# Patient Record
Sex: FEMALE | Race: ASIAN | NOT HISPANIC OR LATINO | URBAN - METROPOLITAN AREA
[De-identification: names, ages, dates, MRNs, and addresses within clinical notes are randomized per-mention and may not be internally consistent; named-entity substitution may affect disease eponyms.]

---

## 2019-05-10 ENCOUNTER — INPATIENT (INPATIENT)
Facility: HOSPITAL | Age: 84
LOS: 10 days | Discharge: NURSING, REHAB OR LONG-TERM CARE | DRG: 312 | End: 2019-05-21
Attending: INTERNAL MEDICINE | Admitting: INTERNAL MEDICINE
Payer: MEDICARE

## 2019-05-10 VITALS
WEIGHT: 98.11 LBS | OXYGEN SATURATION: 97 % | HEART RATE: 93 BPM | RESPIRATION RATE: 16 BRPM | TEMPERATURE: 97 F | DIASTOLIC BLOOD PRESSURE: 53 MMHG | HEIGHT: 59 IN | SYSTOLIC BLOOD PRESSURE: 127 MMHG

## 2019-05-10 DIAGNOSIS — Z29.9 ENCOUNTER FOR PROPHYLACTIC MEASURES, UNSPECIFIED: ICD-10-CM

## 2019-05-10 DIAGNOSIS — N18.6 END STAGE RENAL DISEASE: ICD-10-CM

## 2019-05-10 DIAGNOSIS — R55 SYNCOPE AND COLLAPSE: ICD-10-CM

## 2019-05-10 DIAGNOSIS — I77.0 ARTERIOVENOUS FISTULA, ACQUIRED: Chronic | ICD-10-CM

## 2019-05-10 DIAGNOSIS — E87.6 HYPOKALEMIA: ICD-10-CM

## 2019-05-10 DIAGNOSIS — C90.00 MULTIPLE MYELOMA NOT HAVING ACHIEVED REMISSION: ICD-10-CM

## 2019-05-10 DIAGNOSIS — I82.409 ACUTE EMBOLISM AND THROMBOSIS OF UNSPECIFIED DEEP VEINS OF UNSPECIFIED LOWER EXTREMITY: ICD-10-CM

## 2019-05-10 DIAGNOSIS — E78.5 HYPERLIPIDEMIA, UNSPECIFIED: ICD-10-CM

## 2019-05-10 DIAGNOSIS — A04.72 ENTEROCOLITIS DUE TO CLOSTRIDIUM DIFFICILE, NOT SPECIFIED AS RECURRENT: ICD-10-CM

## 2019-05-10 LAB
ALBUMIN SERPL ELPH-MCNC: 1.9 G/DL — LOW (ref 3.5–5)
ALP SERPL-CCNC: 110 U/L — SIGNIFICANT CHANGE UP (ref 40–120)
ALT FLD-CCNC: 13 U/L DA — SIGNIFICANT CHANGE UP (ref 10–60)
ANION GAP SERPL CALC-SCNC: 8 MMOL/L — SIGNIFICANT CHANGE UP (ref 5–17)
ANION GAP SERPL CALC-SCNC: 8 MMOL/L — SIGNIFICANT CHANGE UP (ref 5–17)
APTT BLD: 27.9 SEC — SIGNIFICANT CHANGE UP (ref 27.5–36.3)
AST SERPL-CCNC: 25 U/L — SIGNIFICANT CHANGE UP (ref 10–40)
BASOPHILS # BLD AUTO: 0.01 K/UL — SIGNIFICANT CHANGE UP (ref 0–0.2)
BASOPHILS NFR BLD AUTO: 0.1 % — SIGNIFICANT CHANGE UP (ref 0–2)
BILIRUB SERPL-MCNC: 0.4 MG/DL — SIGNIFICANT CHANGE UP (ref 0.2–1.2)
BUN SERPL-MCNC: 23 MG/DL — HIGH (ref 7–18)
BUN SERPL-MCNC: 25 MG/DL — HIGH (ref 7–18)
CALCIUM SERPL-MCNC: 7.3 MG/DL — LOW (ref 8.4–10.5)
CALCIUM SERPL-MCNC: 7.5 MG/DL — LOW (ref 8.4–10.5)
CHLORIDE SERPL-SCNC: 100 MMOL/L — SIGNIFICANT CHANGE UP (ref 96–108)
CHLORIDE SERPL-SCNC: 100 MMOL/L — SIGNIFICANT CHANGE UP (ref 96–108)
CO2 SERPL-SCNC: 31 MMOL/L — SIGNIFICANT CHANGE UP (ref 22–31)
CO2 SERPL-SCNC: 31 MMOL/L — SIGNIFICANT CHANGE UP (ref 22–31)
CREAT SERPL-MCNC: 3.86 MG/DL — HIGH (ref 0.5–1.3)
CREAT SERPL-MCNC: 4.06 MG/DL — HIGH (ref 0.5–1.3)
EOSINOPHIL # BLD AUTO: 0 K/UL — SIGNIFICANT CHANGE UP (ref 0–0.5)
EOSINOPHIL NFR BLD AUTO: 0 % — SIGNIFICANT CHANGE UP (ref 0–6)
GLUCOSE SERPL-MCNC: 104 MG/DL — HIGH (ref 70–99)
GLUCOSE SERPL-MCNC: 87 MG/DL — SIGNIFICANT CHANGE UP (ref 70–99)
HCT VFR BLD CALC: 26 % — LOW (ref 34.5–45)
HGB BLD-MCNC: 8.5 G/DL — LOW (ref 11.5–15.5)
IMM GRANULOCYTES NFR BLD AUTO: 0.4 % — SIGNIFICANT CHANGE UP (ref 0–1.5)
INR BLD: 0.97 RATIO — SIGNIFICANT CHANGE UP (ref 0.88–1.16)
LIDOCAIN IGE QN: 179 U/L — SIGNIFICANT CHANGE UP (ref 73–393)
LYMPHOCYTES # BLD AUTO: 1.29 K/UL — SIGNIFICANT CHANGE UP (ref 1–3.3)
LYMPHOCYTES # BLD AUTO: 12.3 % — LOW (ref 13–44)
MCHC RBC-ENTMCNC: 32.2 PG — SIGNIFICANT CHANGE UP (ref 27–34)
MCHC RBC-ENTMCNC: 32.7 GM/DL — SIGNIFICANT CHANGE UP (ref 32–36)
MCV RBC AUTO: 98.5 FL — SIGNIFICANT CHANGE UP (ref 80–100)
MONOCYTES # BLD AUTO: 1.34 K/UL — HIGH (ref 0–0.9)
MONOCYTES NFR BLD AUTO: 12.8 % — SIGNIFICANT CHANGE UP (ref 2–14)
NEUTROPHILS # BLD AUTO: 7.78 K/UL — HIGH (ref 1.8–7.4)
NEUTROPHILS NFR BLD AUTO: 74.4 % — SIGNIFICANT CHANGE UP (ref 43–77)
NRBC # BLD: 0 /100 WBCS — SIGNIFICANT CHANGE UP (ref 0–0)
PLATELET # BLD AUTO: 128 K/UL — LOW (ref 150–400)
POTASSIUM SERPL-MCNC: 2.7 MMOL/L — CRITICAL LOW (ref 3.5–5.3)
POTASSIUM SERPL-MCNC: 3 MMOL/L — LOW (ref 3.5–5.3)
POTASSIUM SERPL-SCNC: 2.7 MMOL/L — CRITICAL LOW (ref 3.5–5.3)
POTASSIUM SERPL-SCNC: 3 MMOL/L — LOW (ref 3.5–5.3)
PROT SERPL-MCNC: 4.3 G/DL — LOW (ref 6–8.3)
PROTHROM AB SERPL-ACNC: 10.7 SEC — SIGNIFICANT CHANGE UP (ref 10–12.9)
RBC # BLD: 2.64 M/UL — LOW (ref 3.8–5.2)
RBC # FLD: 21.6 % — HIGH (ref 10.3–14.5)
SODIUM SERPL-SCNC: 139 MMOL/L — SIGNIFICANT CHANGE UP (ref 135–145)
SODIUM SERPL-SCNC: 139 MMOL/L — SIGNIFICANT CHANGE UP (ref 135–145)
TROPONIN I SERPL-MCNC: 0.04 NG/ML — SIGNIFICANT CHANGE UP (ref 0–0.04)
WBC # BLD: 10.46 K/UL — SIGNIFICANT CHANGE UP (ref 3.8–10.5)
WBC # FLD AUTO: 10.46 K/UL — SIGNIFICANT CHANGE UP (ref 3.8–10.5)

## 2019-05-10 PROCEDURE — 99285 EMERGENCY DEPT VISIT HI MDM: CPT

## 2019-05-10 PROCEDURE — 71045 X-RAY EXAM CHEST 1 VIEW: CPT | Mod: 26

## 2019-05-10 RX ORDER — SEVELAMER CARBONATE 2400 MG/1
800 POWDER, FOR SUSPENSION ORAL
Refills: 0 | Status: DISCONTINUED | OUTPATIENT
Start: 2019-05-10 | End: 2019-05-21

## 2019-05-10 RX ORDER — ANASTROZOLE 1 MG/1
1 TABLET ORAL DAILY
Refills: 0 | Status: DISCONTINUED | OUTPATIENT
Start: 2019-05-10 | End: 2019-05-21

## 2019-05-10 RX ORDER — BRIMONIDINE TARTRATE 2 MG/MG
1 SOLUTION/ DROPS OPHTHALMIC
Qty: 0 | Refills: 0 | DISCHARGE

## 2019-05-10 RX ORDER — ACYCLOVIR SODIUM 500 MG
200 VIAL (EA) INTRAVENOUS DAILY
Refills: 0 | Status: DISCONTINUED | OUTPATIENT
Start: 2019-05-10 | End: 2019-05-21

## 2019-05-10 RX ORDER — SODIUM CHLORIDE 9 MG/ML
1000 INJECTION INTRAMUSCULAR; INTRAVENOUS; SUBCUTANEOUS
Refills: 0 | Status: DISCONTINUED | OUTPATIENT
Start: 2019-05-10 | End: 2019-05-14

## 2019-05-10 RX ORDER — POTASSIUM CHLORIDE 20 MEQ
40 PACKET (EA) ORAL ONCE
Refills: 0 | Status: COMPLETED | OUTPATIENT
Start: 2019-05-10 | End: 2019-05-10

## 2019-05-10 RX ORDER — ENTECAVIR 0.5 MG/1
1 TABLET ORAL
Qty: 0 | Refills: 0 | DISCHARGE

## 2019-05-10 RX ORDER — POTASSIUM CHLORIDE 20 MEQ
10 PACKET (EA) ORAL ONCE
Refills: 0 | Status: COMPLETED | OUTPATIENT
Start: 2019-05-10 | End: 2019-05-10

## 2019-05-10 RX ORDER — ANASTROZOLE 1 MG/1
1 TABLET ORAL
Qty: 0 | Refills: 0 | DISCHARGE

## 2019-05-10 RX ORDER — APIXABAN 2.5 MG/1
2.5 TABLET, FILM COATED ORAL EVERY 12 HOURS
Refills: 0 | Status: DISCONTINUED | OUTPATIENT
Start: 2019-05-10 | End: 2019-05-21

## 2019-05-10 RX ORDER — ALLOPURINOL 300 MG
100 TABLET ORAL DAILY
Refills: 0 | Status: DISCONTINUED | OUTPATIENT
Start: 2019-05-10 | End: 2019-05-21

## 2019-05-10 RX ORDER — ALLOPURINOL 300 MG
1 TABLET ORAL
Qty: 0 | Refills: 0 | DISCHARGE

## 2019-05-10 RX ORDER — BRIMONIDINE TARTRATE 2 MG/MG
1 SOLUTION/ DROPS OPHTHALMIC EVERY 8 HOURS
Refills: 0 | Status: DISCONTINUED | OUTPATIENT
Start: 2019-05-10 | End: 2019-05-21

## 2019-05-10 RX ORDER — VANCOMYCIN HCL 1 G
125 VIAL (EA) INTRAVENOUS EVERY 6 HOURS
Refills: 0 | Status: COMPLETED | OUTPATIENT
Start: 2019-05-10 | End: 2019-05-20

## 2019-05-10 RX ORDER — SIMVASTATIN 20 MG/1
1 TABLET, FILM COATED ORAL
Qty: 0 | Refills: 0 | DISCHARGE

## 2019-05-10 RX ORDER — POTASSIUM CHLORIDE 20 MEQ
20 PACKET (EA) ORAL ONCE
Refills: 0 | Status: COMPLETED | OUTPATIENT
Start: 2019-05-10 | End: 2019-05-10

## 2019-05-10 RX ORDER — SEVELAMER CARBONATE 2400 MG/1
1 POWDER, FOR SUSPENSION ORAL
Qty: 0 | Refills: 0 | DISCHARGE

## 2019-05-10 RX ORDER — SIMVASTATIN 20 MG/1
40 TABLET, FILM COATED ORAL AT BEDTIME
Refills: 0 | Status: DISCONTINUED | OUTPATIENT
Start: 2019-05-10 | End: 2019-05-21

## 2019-05-10 RX ORDER — ACYCLOVIR SODIUM 500 MG
1 VIAL (EA) INTRAVENOUS
Qty: 0 | Refills: 0 | DISCHARGE

## 2019-05-10 RX ORDER — FUROSEMIDE 40 MG
80 TABLET ORAL DAILY
Refills: 0 | Status: DISCONTINUED | OUTPATIENT
Start: 2019-05-10 | End: 2019-05-10

## 2019-05-10 RX ADMIN — Medication 100 MILLIEQUIVALENT(S): at 12:55

## 2019-05-10 RX ADMIN — Medication 125 MILLIGRAM(S): at 23:01

## 2019-05-10 RX ADMIN — SIMVASTATIN 40 MILLIGRAM(S): 20 TABLET, FILM COATED ORAL at 22:57

## 2019-05-10 RX ADMIN — SODIUM CHLORIDE 60 MILLILITER(S): 9 INJECTION INTRAMUSCULAR; INTRAVENOUS; SUBCUTANEOUS at 22:58

## 2019-05-10 RX ADMIN — Medication 40 MILLIEQUIVALENT(S): at 12:55

## 2019-05-10 RX ADMIN — APIXABAN 2.5 MILLIGRAM(S): 2.5 TABLET, FILM COATED ORAL at 18:34

## 2019-05-10 RX ADMIN — BRIMONIDINE TARTRATE 1 DROP(S): 2 SOLUTION/ DROPS OPHTHALMIC at 22:57

## 2019-05-10 RX ADMIN — Medication 20 MILLIEQUIVALENT(S): at 23:35

## 2019-05-10 RX ADMIN — Medication 125 MILLIGRAM(S): at 19:11

## 2019-05-10 NOTE — H&P ADULT - HISTORY OF PRESENT ILLNESS
85 y/o Female from home with PMHX ESRD on HD. MM on chemo, Cdiff , DVT comes to ED for syncopal episode. Daughter at bedside  who states that pt had two witnessed syncopal events this morning. She says that her legs gave in and she grabbed her. No seizure like activity. Pt was sitting when occurred and dropped water bottle but did not fall, hit head or any trama from episodes. Daughter stated this happened once last year. otherwise pt has been fine. Denies nausea, vomiting, diarrhea, abdominal pain, SOB, chest pain, VU, palpitations, cough, wheezing, joint pain or swelling, fever, chills.

## 2019-05-10 NOTE — ED PROVIDER NOTE - MUSCULOSKELETAL, MLM
Spine appears normal, range of motion is not limited, no muscle or joint tenderness. right dialysis subclavian port present. bilaterally LE edema 2+ pitting. Port otherwise normal

## 2019-05-10 NOTE — H&P ADULT - PROBLEM SELECTOR PLAN 8
IMPROVE VTE score: 9  C/W Eliquis     [x ] Previous VTE                                    3  [ ] Thrombophilia                                  2  [ x] Lower limb paralysis                        2  (unable to hold up >15 seconds)    [ x] Current Cancer (within 6 months)        2   [x] Immobilization > 24 hrs                    1  [ ] ICU/CCU stay > 24 hrs                      1  [x] Age > 60                                         1

## 2019-05-10 NOTE — H&P ADULT - ASSESSMENT
HPI: 85 y/o Female from home with PMHX ESRD on HD. MM on chemo, Cdiff , DVT comes to ED for syncopal episode. Daughter at bedside  who states that pt had two witnessed syncopal events this morning. She says that her legs gave in and she grabbed her. No seizure like activity. Pt was sitting when occurred and dropped water bottle but did not fall, hit head or any trama from episodes. Daughter stated this happened once last year. otherwise pt has been fine. Denies nausea, vomiting, diarrhea, abdominal pain, SOB, chest pain, VU, palpitations, cough, wheezing, joint pain or swelling, fever, chills.    ED course: Patient examined at bedside  VS WNL except for   Physical exam as above with pertinent findings of   Labs significant for  Imaging significant for    Patient will be admitted to the floor/telemetry due to HPI: 83 y/o Female from home with PMHX ESRD on HD. MM on chemo, Cdiff , DVT comes to ED for syncopal episode. Daughter at bedside  who states that pt had two witnessed syncopal events this morning. She says that her legs gave in and she grabbed her. No seizure like activity. Pt was sitting when occurred and dropped water bottle but did not fall, hit head or any trama from episodes. Daughter stated this happened once last year. otherwise pt has been fine. Denies nausea, vomiting, diarrhea, abdominal pain, SOB, chest pain, VU, palpitations, cough, wheezing, joint pain or swelling, fever, chills.    ED course: Patient examined at bedside in NAD AAOx3  VS T(C): 36.6 HR: 79 BP: 112/45 RR: 16 SpO2: 97% (  Physical exam as above   Labs significant for                        8.5    10.46 )-----------( 128      ( 10 May 2019 11:36 )             26.0   05-10    139  |  100  |  23<H>  ----------------------------<  87  2.7<LL>   |  31  |  3.86<H>    Ca    7.5<L>      10 May 2019 11:36    TPro  4.3<L>  /  Alb  1.9<L>  /  TBili  0.4  /  DBili  x   /  AST  25  /  ALT  13  /  AlkPhos  110  05-10    Imaging significant for:   Large-caliber double-lumen right jugular line is noted with tip in the   right atrial region.  Left-sided Niecfs-p-Omrn noted with tip at the caval atrial junction.    Patient will be admitted to the telemetry due to Syncopal episode

## 2019-05-10 NOTE — H&P ADULT - PROBLEM SELECTOR PLAN 5
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

## 2019-05-10 NOTE — H&P ADULT - PROBLEM SELECTOR PLAN 1
Syncopal episode  No trauma.  No chest Pain, no palpitations, no SOB  Differential include: Cardiac Syncope vs Neurocardiogenic syncope  vs Medications in the setting of Chemo vs dehydration in the setting of diarrhea ( Patient with history of Cdiff vs Metabolic  Work up done:  - Electrolytes: Low potassium of 2.7 Replaced Follow BMP   - Check Orthostatics VS  - Check Echo cardiogram  - EKG no Ischemic Changes  - Troponin Negative  - Cardiology: Dr Álvarez

## 2019-05-10 NOTE — H&P ADULT - PROBLEM SELECTOR PLAN 7
IMPROVE VTE score: 9  C/W Eliquis     [x ] Previous VTE                                    3  [ ] Thrombophilia                                  2  [ x] Lower limb paralysis                        2  (unable to hold up >15 seconds)    [ x] Current Cancer (within 6 months)        2   [x] Immobilization > 24 hrs                    1  [ ] ICU/CCU stay > 24 hrs                      1  [x] Age > 60                                         1 History of MM on chemo   Primary care team to contact Heme Oncologist Dr Nate Singh

## 2019-05-10 NOTE — ED ADULT NURSE NOTE - OBJECTIVE STATEMENT
As per daughter pt with syncopal episode x 2. BLLE edema +2 noted. Right CW permacath and Left CW Mediport

## 2019-05-10 NOTE — H&P ADULT - NSICDXPASTMEDICALHX_GEN_ALL_CORE_FT
PAST MEDICAL HISTORY:  Deep vein thrombosis (DVT)     End stage renal disease     HLD (hyperlipidemia)     Multiple myeloma

## 2019-05-10 NOTE — ED PROVIDER NOTE - OBJECTIVE STATEMENT
85 y/o F with PMHX end restage renal disease comes to ED for snycope. pt is accompanied by daughter who states that pt had two witnessed syncopal events this morning witnessed by daughter. no seizure like activity. Pt was sitting when occurred and dropped water bottle but did not fall, hit head or any trama from episodes. Daughter stated this happened once last year. otherwise pt has been fine. Denies Cp, SOB, N/V, diaphoresis.  Pt last had dialysis on Wednesday

## 2019-05-10 NOTE — H&P ADULT - PROBLEM SELECTOR PLAN 2
Potassium of 2.7  Received Oral Potassium  Check BMP Potassium of 2.7  Will hold patient Lasix 80mg daily for now  Received Oral Potassium  Check repeat BMP

## 2019-05-10 NOTE — H&P ADULT - PROBLEM SELECTOR PLAN 4
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

## 2019-05-10 NOTE — H&P ADULT - PROBLEM SELECTOR PLAN 3
History of DVT of lower extremities  C/W Eliquis Patient with history of C diff on Vancomycin 125mg q6hrs to complete for 10 days

## 2019-05-11 LAB
ANION GAP SERPL CALC-SCNC: 6 MMOL/L — SIGNIFICANT CHANGE UP (ref 5–17)
BASOPHILS # BLD AUTO: 0.01 K/UL — SIGNIFICANT CHANGE UP (ref 0–0.2)
BASOPHILS NFR BLD AUTO: 0.1 % — SIGNIFICANT CHANGE UP (ref 0–2)
BUN SERPL-MCNC: 27 MG/DL — HIGH (ref 7–18)
CALCIUM SERPL-MCNC: 7.1 MG/DL — LOW (ref 8.4–10.5)
CHLORIDE SERPL-SCNC: 103 MMOL/L — SIGNIFICANT CHANGE UP (ref 96–108)
CHOLEST SERPL-MCNC: 111 MG/DL — SIGNIFICANT CHANGE UP (ref 10–199)
CO2 SERPL-SCNC: 31 MMOL/L — SIGNIFICANT CHANGE UP (ref 22–31)
CREAT SERPL-MCNC: 4.32 MG/DL — HIGH (ref 0.5–1.3)
EOSINOPHIL # BLD AUTO: 0 K/UL — SIGNIFICANT CHANGE UP (ref 0–0.5)
EOSINOPHIL NFR BLD AUTO: 0 % — SIGNIFICANT CHANGE UP (ref 0–6)
FOLATE SERPL-MCNC: 5.1 NG/ML — SIGNIFICANT CHANGE UP
GLUCOSE SERPL-MCNC: 85 MG/DL — SIGNIFICANT CHANGE UP (ref 70–99)
HBA1C BLD-MCNC: 5.7 % — HIGH (ref 4–5.6)
HCT VFR BLD CALC: 25.4 % — LOW (ref 34.5–45)
HDLC SERPL-MCNC: 50 MG/DL — SIGNIFICANT CHANGE UP
HGB BLD-MCNC: 8.1 G/DL — LOW (ref 11.5–15.5)
IMM GRANULOCYTES NFR BLD AUTO: 0.5 % — SIGNIFICANT CHANGE UP (ref 0–1.5)
LIPID PNL WITH DIRECT LDL SERPL: 39 MG/DL — SIGNIFICANT CHANGE UP
LYMPHOCYTES # BLD AUTO: 1.47 K/UL — SIGNIFICANT CHANGE UP (ref 1–3.3)
LYMPHOCYTES # BLD AUTO: 14.5 % — SIGNIFICANT CHANGE UP (ref 13–44)
MAGNESIUM SERPL-MCNC: 2.1 MG/DL — SIGNIFICANT CHANGE UP (ref 1.6–2.6)
MCHC RBC-ENTMCNC: 31.2 PG — SIGNIFICANT CHANGE UP (ref 27–34)
MCHC RBC-ENTMCNC: 31.9 GM/DL — LOW (ref 32–36)
MCV RBC AUTO: 97.7 FL — SIGNIFICANT CHANGE UP (ref 80–100)
MONOCYTES # BLD AUTO: 1.36 K/UL — HIGH (ref 0–0.9)
MONOCYTES NFR BLD AUTO: 13.4 % — SIGNIFICANT CHANGE UP (ref 2–14)
NEUTROPHILS # BLD AUTO: 7.26 K/UL — SIGNIFICANT CHANGE UP (ref 1.8–7.4)
NEUTROPHILS NFR BLD AUTO: 71.5 % — SIGNIFICANT CHANGE UP (ref 43–77)
NRBC # BLD: 0 /100 WBCS — SIGNIFICANT CHANGE UP (ref 0–0)
PHOSPHATE SERPL-MCNC: 4.4 MG/DL — SIGNIFICANT CHANGE UP (ref 2.5–4.5)
PLATELET # BLD AUTO: 109 K/UL — LOW (ref 150–400)
POTASSIUM SERPL-MCNC: 3.6 MMOL/L — SIGNIFICANT CHANGE UP (ref 3.5–5.3)
POTASSIUM SERPL-SCNC: 3.6 MMOL/L — SIGNIFICANT CHANGE UP (ref 3.5–5.3)
RBC # BLD: 2.6 M/UL — LOW (ref 3.8–5.2)
RBC # FLD: 21.6 % — HIGH (ref 10.3–14.5)
SODIUM SERPL-SCNC: 140 MMOL/L — SIGNIFICANT CHANGE UP (ref 135–145)
TOTAL CHOLESTEROL/HDL RATIO MEASUREMENT: 2.2 RATIO — LOW (ref 3.3–7.1)
TRIGL SERPL-MCNC: 112 MG/DL — SIGNIFICANT CHANGE UP (ref 10–149)
TSH SERPL-MCNC: 3.97 UU/ML — SIGNIFICANT CHANGE UP (ref 0.34–4.82)
VIT B12 SERPL-MCNC: 983 PG/ML — SIGNIFICANT CHANGE UP (ref 232–1245)
WBC # BLD: 10.15 K/UL — SIGNIFICANT CHANGE UP (ref 3.8–10.5)
WBC # FLD AUTO: 10.15 K/UL — SIGNIFICANT CHANGE UP (ref 3.8–10.5)

## 2019-05-11 RX ORDER — POTASSIUM CHLORIDE 20 MEQ
20 PACKET (EA) ORAL ONCE
Refills: 0 | Status: COMPLETED | OUTPATIENT
Start: 2019-05-11 | End: 2019-05-11

## 2019-05-11 RX ORDER — POTASSIUM CHLORIDE 20 MEQ
20 PACKET (EA) ORAL ONCE
Refills: 0 | Status: DISCONTINUED | OUTPATIENT
Start: 2019-05-11 | End: 2019-05-11

## 2019-05-11 RX ADMIN — APIXABAN 2.5 MILLIGRAM(S): 2.5 TABLET, FILM COATED ORAL at 06:03

## 2019-05-11 RX ADMIN — Medication 125 MILLIGRAM(S): at 13:39

## 2019-05-11 RX ADMIN — SODIUM CHLORIDE 60 MILLILITER(S): 9 INJECTION INTRAMUSCULAR; INTRAVENOUS; SUBCUTANEOUS at 19:55

## 2019-05-11 RX ADMIN — BRIMONIDINE TARTRATE 1 DROP(S): 2 SOLUTION/ DROPS OPHTHALMIC at 06:03

## 2019-05-11 RX ADMIN — Medication 20 MILLIEQUIVALENT(S): at 01:10

## 2019-05-11 RX ADMIN — Medication 125 MILLIGRAM(S): at 23:13

## 2019-05-11 RX ADMIN — SEVELAMER CARBONATE 800 MILLIGRAM(S): 2400 POWDER, FOR SUSPENSION ORAL at 19:55

## 2019-05-11 RX ADMIN — SEVELAMER CARBONATE 800 MILLIGRAM(S): 2400 POWDER, FOR SUSPENSION ORAL at 09:17

## 2019-05-11 RX ADMIN — Medication 100 MILLIGRAM(S): at 13:37

## 2019-05-11 RX ADMIN — BRIMONIDINE TARTRATE 1 DROP(S): 2 SOLUTION/ DROPS OPHTHALMIC at 13:38

## 2019-05-11 RX ADMIN — Medication 125 MILLIGRAM(S): at 19:55

## 2019-05-11 RX ADMIN — Medication 200 MILLIGRAM(S): at 13:37

## 2019-05-11 RX ADMIN — BRIMONIDINE TARTRATE 1 DROP(S): 2 SOLUTION/ DROPS OPHTHALMIC at 21:09

## 2019-05-11 RX ADMIN — SEVELAMER CARBONATE 800 MILLIGRAM(S): 2400 POWDER, FOR SUSPENSION ORAL at 13:37

## 2019-05-11 RX ADMIN — SIMVASTATIN 40 MILLIGRAM(S): 20 TABLET, FILM COATED ORAL at 21:09

## 2019-05-11 RX ADMIN — Medication 125 MILLIGRAM(S): at 06:04

## 2019-05-11 RX ADMIN — APIXABAN 2.5 MILLIGRAM(S): 2.5 TABLET, FILM COATED ORAL at 19:55

## 2019-05-11 RX ADMIN — ANASTROZOLE 1 MILLIGRAM(S): 1 TABLET ORAL at 13:37

## 2019-05-11 NOTE — CONSULT NOTE ADULT - PROBLEM SELECTOR RECOMMENDATION 9
Maintenance HD schedule MWF.  Missed HD yesterday, Electrolytes and volume status acceptable.   PLan for HD today, consent obtained from pts daughter.  BP controlled.   w/u of syncope pending.

## 2019-05-11 NOTE — PROGRESS NOTE ADULT - ASSESSMENT
HPI: 83 y/o Female from home with PMHX ESRD on HD. MM on chemo, Cdiff , DVT comes to ED for syncopal episode. Daughter at bedside  who states that pt had two witnessed syncopal events this morning. She says that her legs gave in and she grabbed her. No seizure like activity. Pt was sitting when occurred and dropped water bottle but did not fall, hit head or any trama from episodes. Daughter stated this happened once last year. otherwise pt has been fine. Denies nausea, vomiting, diarrhea, abdominal pain, SOB, chest pain, VU, palpitations, cough, wheezing, joint pain or swelling, fever, chills.    ED course: Patient examined at bedside in NAD AAOx3  VS T(C): 36.6 HR: 79 BP: 112/45 RR: 16 SpO2: 97% (  Physical exam as above   Labs significant for                        8.5    10.46 )-----------( 128      ( 10 May 2019 11:36 )             26.0   05-10    139  |  100  |  23<H>  ----------------------------<  87  2.7<LL>   |  31  |  3.86<H>    Ca    7.5<L>      10 May 2019 11:36    TPro  4.3<L>  /  Alb  1.9<L>  /  TBili  0.4  /  DBili  x   /  AST  25  /  ALT  13  /  AlkPhos  110  05-10    Imaging significant for:   Large-caliber double-lumen right jugular line is noted with tip in the   right atrial region.  Left-sided Nynzxj-d-Lnoy noted with tip at the caval atrial junction.    Patient will be admitted to the telemetry due to Syncopal episode HPI: 85 y/o Female from home with PMHX ESRD on HD. MM on chemo, Cdiff , DVT comes to ED for syncopal episode. Daughter at bedside  who states that pt had two witnessed syncopal events this morning. She says that her legs gave in and she grabbed her. No seizure like activity. Pt was sitting when occurred and dropped water bottle but did not fall, hit head or any trama from episodes. Daughter stated this happened once last year. otherwise pt has been fine. Denies nausea, vomiting, diarrhea, abdominal pain, SOB, chest pain, VU, palpitations, cough, wheezing, joint pain or swelling, fever, chills.    ED course: Patient examined at bedside in NAD AAOx3  VS T(C): 36.6 HR: 79 BP: 112/45 RR: 16 SpO2: 97% (  Physical exam as above     Imaging significant for:   Large-caliber double-lumen right jugular line is noted with tip in the   right atrial region.  Left-sided Jqwbet-j-Ymuu noted with tip at the caval atrial junction.    Patient will be admitted to the telemetry due to syncopal episode

## 2019-05-11 NOTE — CONSULT NOTE ADULT - SUBJECTIVE AND OBJECTIVE BOX
[facility] Consult Note Nephrology - CONSULTATION NOTE - [phone] - Dr Walsh / Dr Ortiz / Dr Onofre / Dr Espitia / Dr Romano / Dr Goyal / Dr Lund / Dr Jacobo    Patient is a 84y Female with ESRD on HD MWF, at [facility] HD, MM on chemo, Cdiff , DVT comes to ED for syncopal episode. Daughter at bedside  who states that pt had two witnessed syncopal events this morning. She says that her legs gave in and she grabbed her. No seizure like activity. Pt was sitting when occurred and dropped water bottle but did not fall, hit head or any trama from episodes. Daughter stated this happened once last year. Denies nausea, vomiting, diarrhea, abdominal pain, SOB, chest pain, VU, palpitations, cough, wheezing, joint pain or swelling, fever, chills. Pt was last dialyzed at 5/8. She has RIJ tunneled catheter, and immature LUE AVF.       PAST MEDICAL & SURGICAL HISTORY:  Deep vein thrombosis (DVT)  HLD (hyperlipidemia)  Multiple myeloma  End stage renal disease  AV fistula    Allergies:  No Known Allergies    Home Medications Reviewed  Hospital Medications:   MEDICATIONS  (STANDING):  acyclovir   Oral Tab/Cap 200 milliGRAM(s) Oral daily  allopurinol 100 milliGRAM(s) Oral daily  anastrozole 1 milliGRAM(s) Oral daily  apixaban 2.5 milliGRAM(s) Oral every 12 hours  brimonidine 0.2% Ophthalmic Solution 1 Drop(s) Both EYES every 8 hours  sevelamer carbonate 800 milliGRAM(s) Oral three times a day with meals  simvastatin 40 milliGRAM(s) Oral at bedtime  sodium chloride 0.9%. 1000 milliLiter(s) (60 mL/Hr) IV Continuous <Continuous>  vancomycin    Solution 125 milliGRAM(s) Oral every 6 hours    SOCIAL HISTORY:  Denies ETOh,Smoking,   FAMILY HISTORY:    REVIEW OF SYSTEMS:  CONSTITUTIONAL: No weakness, fevers or chills  EYES/ENT: No visual changes;  No vertigo or throat pain   NECK: No pain or stiffness  RESPIRATORY: No cough, wheezing, hemoptysis; No shortness of breath  CARDIOVASCULAR: No chest pain or palpitations.  GASTROINTESTINAL: No abdominal or epigastric pain. No nausea, vomiting, or hematemesis; No diarrhea or constipation. No melena or hematochezia.  GENITOURINARY: No dysuria, frequency, foamy urine, urinary urgency, incontinence or hematuria  NEUROLOGICAL: No numbness or weakness  SKIN: No itching, burning, rashes, or lesions   VASCULAR: No bilateral lower extremity edema.   All other review of systems is negative unless indicated above.    VITALS:  T(F): 97.4 (05-11-19 @ 12:47), Max: 97.7 (05-10-19 @ 23:05)  HR: 74 (05-11-19 @ 12:47)  BP: 119/35 (05-11-19 @ 12:47)  RR: 18 (05-11-19 @ 12:47)  SpO2: 96% (05-11-19 @ 12:47)  Wt(kg): --    05-10 @ 07:01  -  05-11 @ 07:00  --------------------------------------------------------  IN: 420 mL / OUT: 0 mL / NET: 420 mL    05-11 @ 07:01  -  05-11 @ 15:32  --------------------------------------------------------  IN: 118 mL / OUT: 0 mL / NET: 118 mL        PHYSICAL EXAM:  Constitutional: NAD  HEENT: anicteric sclera, oropharynx clear, MMM  Neck: No JVD  Respiratory: CTAB, no wheezes, rales or rhonchi  Cardiovascular: S1, S2, RRR  Gastrointestinal: BS+, soft, NT/ND  Extremities: No cyanosis or clubbing. No peripheral edema  Neurological: A/O x 3, no focal deficits  Psychiatric: Normal mood, normal affect  : No CVA tenderness. No helton.   Skin: No rashes  Vascular Access: RIJ Tunneled cath. LUE AV access +thrill and bruit.     LABS:  05-11    140  |  103  |  27<H>  ----------------------------<  85  3.6   |  31  |  4.32<H>    Ca    7.1<L>      11 May 2019 07:00  Phos  4.4     05-11  Mg     2.1     05-11    TPro  4.3<L>  /  Alb  1.9<L>  /  TBili  0.4  /  DBili      /  AST  25  /  ALT  13  /  AlkPhos  110  05-10    Creatinine Trend: 4.32 <--, 4.06 <--, 3.86 <--                        8.1    10.15 )-----------( 109      ( 11 May 2019 07:00 )             25.4     Urine Studies:      RADIOLOGY & ADDITIONAL STUDIES:

## 2019-05-11 NOTE — PROGRESS NOTE ADULT - SUBJECTIVE AND OBJECTIVE BOX
PGY1 Note discussed with Supervising Resident and Primary Attending.    Patient is a 84y old  Female who presents with a chief complaint of Weakness and syncope (10 May 2019 17:20)      INTERVAL HPI/OVERNIGHT EVENTS :  No acute overnight events. Patient seen and examined at bedside. Patient has no current complaints. Patient denies nausea, vomiting, diarrhea, chest pain, SOB, headache.  MEDICATIONS  (STANDING):  acyclovir   Oral Tab/Cap 200 milliGRAM(s) Oral daily  allopurinol 100 milliGRAM(s) Oral daily  anastrozole 1 milliGRAM(s) Oral daily  apixaban 2.5 milliGRAM(s) Oral every 12 hours  brimonidine 0.2% Ophthalmic Solution 1 Drop(s) Both EYES every 8 hours  sevelamer carbonate 800 milliGRAM(s) Oral three times a day with meals  simvastatin 40 milliGRAM(s) Oral at bedtime  sodium chloride 0.9%. 1000 milliLiter(s) (60 mL/Hr) IV Continuous <Continuous>  vancomycin    Solution 125 milliGRAM(s) Oral every 6 hours    MEDICATIONS  (PRN):      Allergies    No Known Allergies    Intolerances        REVIEW OF SYSTEMS :  * General: denies chills, fatigue  * Eyes: denies vision changes  * Respiratory: denies cough, SOB, wheezing  * Cardio: denies chest pain, palpitations  * GI: denies abd pain, nausea, vomiting, diarrhea  * : denies dysuria  * Neuro: denies headaches, numbness, weakness  * MSK: denies joint pain  * Skin: denies itching, rashes    Vital Signs Last 24 Hrs  T(C): 36.4 (11 May 2019 07:56), Max: 36.6 (10 May 2019 15:30)  T(F): 97.6 (11 May 2019 07:56), Max: 97.8 (10 May 2019 15:30)  HR: 73 (11 May 2019 07:56) (70 - 93)  BP: 117/41 (11 May 2019 07:56) (104/38 - 127/53)  BP(mean): --  RR: 18 (11 May 2019 07:56) (14 - 18)  SpO2: 96% (11 May 2019 07:56) (95% - 98%)    PHYSICAL EXAM :  * General: no acute distress, well-nourished, well-developed  * HEENT: atraumatic, normocephalic; moist mucus membranes; supple neck; no JVD  * CN: EOMI, PERRL  * Respiratory: cta b/l; no wheezes, rales, rhonchi  * Cardio: RRR; no appreciable murmurs, rubs, gallops  * Abd: soft, nontender, nondistended, +BS  * Neuro: AAOx3; strength 5/5; sensation intact throughout  * Extremities: no clubbing, cyanosis, edema  * Skin: no rashes    LABS:                          8.1    10.15 )-----------( 109      ( 11 May 2019 07:00 )             25.4     05-11    140  |  103  |  27<H>  ----------------------------<  85  3.6   |  31  |  4.32<H>    Ca    7.1<L>      11 May 2019 07:00  Phos  4.4     05-11  Mg     2.1     05-11    TPro  4.3<L>  /  Alb  1.9<L>  /  TBili  0.4  /  DBili  x   /  AST  25  /  ALT  13  /  AlkPhos  110  05-10    PT/INR - ( 10 May 2019 11:36 )   PT: 10.7 sec;   INR: 0.97 ratio         PTT - ( 10 May 2019 11:36 )  PTT:27.9 sec    CAPILLARY BLOOD GLUCOSE          RADIOLOGY & ADDITIONAL TESTS:   no new imaging    Imaging Personally Reviewed:    Consultant(s) Notes Reviewed: PGY1 Note discussed with Supervising Resident and Primary Attending.    Patient is a 84y old  Female who presents with a chief complaint of Weakness and syncope (10 May 2019 17:20)      INTERVAL HPI/OVERNIGHT EVENTS:  No acute overnight events. Patient seen and examined at bedside. Patient has no current complaints. Patient denies nausea, vomiting, diarrhea, chest pain, SOB, headache. Will need HD today.    MEDICATIONS  (STANDING):  acyclovir   Oral Tab/Cap 200 milliGRAM(s) Oral daily  allopurinol 100 milliGRAM(s) Oral daily  anastrozole 1 milliGRAM(s) Oral daily  apixaban 2.5 milliGRAM(s) Oral every 12 hours  brimonidine 0.2% Ophthalmic Solution 1 Drop(s) Both EYES every 8 hours  sevelamer carbonate 800 milliGRAM(s) Oral three times a day with meals  simvastatin 40 milliGRAM(s) Oral at bedtime  sodium chloride 0.9%. 1000 milliLiter(s) (60 mL/Hr) IV Continuous <Continuous>  vancomycin    Solution 125 milliGRAM(s) Oral every 6 hours    MEDICATIONS  (PRN):      Allergies    No Known Allergies    Intolerances        REVIEW OF SYSTEMS:  * General: denies chills, fatigue  * Eyes: denies vision changes  * Respiratory: denies cough, SOB, wheezing  * Cardio: denies chest pain, palpitations  * GI: denies abd pain, nausea, vomiting, diarrhea  * : denies dysuria  * Neuro: denies headaches, numbness, weakness  * MSK: denies joint pain  * Skin: denies itching, rashes    Vital Signs Last 24 Hrs  T(C): 36.4 (11 May 2019 07:56), Max: 36.6 (10 May 2019 15:30)  T(F): 97.6 (11 May 2019 07:56), Max: 97.8 (10 May 2019 15:30)  HR: 73 (11 May 2019 07:56) (70 - 93)  BP: 117/41 (11 May 2019 07:56) (104/38 - 127/53)  BP(mean): --  RR: 18 (11 May 2019 07:56) (14 - 18)  SpO2: 96% (11 May 2019 07:56) (95% - 98%)    PHYSICAL EXAM:  * General: no acute distress, well-developed, cachectic  * HEENT: atraumatic, normocephalic; moist mucus membranes; supple neck; no JVD  * CN: EOMI, PERRL  * Respiratory: cta b/l; no wheezes, rales, rhonchi  * Cardio: RRR; no appreciable murmurs, rubs, gallops  * Abd: soft, nontender, nondistended, +BS  * Neuro: AAOx3; strength 5/5; sensation intact throughout  * Extremities: no clubbing, cyanosis; b/l LE pitting edema 2+  * Skin: no rashes    LABS:                          8.1    10.15 )-----------( 109      ( 11 May 2019 07:00 )             25.4     05-11    140  |  103  |  27<H>  ----------------------------<  85  3.6   |  31  |  4.32<H>    Ca    7.1<L>      11 May 2019 07:00  Phos  4.4     05-11  Mg     2.1     05-11    TPro  4.3<L>  /  Alb  1.9<L>  /  TBili  0.4  /  DBili  x   /  AST  25  /  ALT  13  /  AlkPhos  110  05-10    PT/INR - ( 10 May 2019 11:36 )   PT: 10.7 sec;   INR: 0.97 ratio         PTT - ( 10 May 2019 11:36 )  PTT:27.9 sec    CAPILLARY BLOOD GLUCOSE          RADIOLOGY & ADDITIONAL TESTS:   no new imaging    Consultant(s) Notes Reviewed: yes

## 2019-05-11 NOTE — PROGRESS NOTE ADULT - PROBLEM SELECTOR PLAN 1
Syncopal episode  No trauma.  No chest Pain, no palpitations, no SOB  Differential include: Cardiac Syncope vs Neurocardiogenic syncope  vs Medications in the setting of Chemo vs dehydration in the setting of diarrhea ( Patient with history of Cdiff vs Metabolic  Work up done:  - Electrolytes: Low potassium of 2.7 Replaced Follow BMP   - Check Orthostatics VS  - Check Echo cardiogram  - EKG no Ischemic Changes  - Troponin Negative  - Cardiology: Dr Álvarez S/p syncopal episode  - Denies chest pain, palpitations, SOB  - Cardiac syncope vs neurocardiogenic syncope vs medications in the setting of chemo vs dehydration in the setting of diarrhea (history of Cdiff) vs metabolic  - C/w tele  - Hypokalemic to 2.7 on admission, replaced, still low, patient missed HD yesterday  - EKG no ischemic changes  - Trop negative  - F/u echo  - Cardio Dr Álvarez

## 2019-05-11 NOTE — CONSULT NOTE ADULT - SUBJECTIVE AND OBJECTIVE BOX
HISTORY OF PRESENT ILLNESS: HPI:  85 y/o Female from home with PMHX ESRD on HD. MM on chemo, (HAS RIGHT PERMACATH AND LEFT MEDIPORT)  Cdiff , DVT comes to ED for syncopal episode. Daughter at bedside  who states that pt had two witnessed syncopal events this morning. She says that her legs gave in and she grabbed her. No seizure like activity. Pt was sitting when occurred and dropped water bottle but did not fall, hit head or any trama from episodes. Daughter stated this happened once last year. otherwise pt has been fine. Denies nausea, vomiting, diarrhea, abdominal pain, SOB, chest pain, UV, palpitations, cough, wheezing, joint pain or swelling, fever, chills. (10 May 2019 17:20)      PAST MEDICAL & SURGICAL HISTORY:  Deep vein thrombosis (DVT)  HLD (hyperlipidemia)  Multiple myeloma  End stage renal disease  AV fistula          MEDICATIONS:  MEDICATIONS  (STANDING):  acyclovir   Oral Tab/Cap 200 milliGRAM(s) Oral daily  allopurinol 100 milliGRAM(s) Oral daily  anastrozole 1 milliGRAM(s) Oral daily  apixaban 2.5 milliGRAM(s) Oral every 12 hours  brimonidine 0.2% Ophthalmic Solution 1 Drop(s) Both EYES every 8 hours  sevelamer carbonate 800 milliGRAM(s) Oral three times a day with meals  simvastatin 40 milliGRAM(s) Oral at bedtime  sodium chloride 0.9%. 1000 milliLiter(s) (60 mL/Hr) IV Continuous <Continuous>  vancomycin    Solution 125 milliGRAM(s) Oral every 6 hours      Allergies    No Known Allergies    Intolerances        FAMILY HISTORY:    Non-contributary for premature coronary disease or sudden cardiac death    SOCIAL HISTORY:    [ X] Non-smoker  [ ] Smoker  [ ] Alcohol      REVIEW OF SYSTEMS:  [ ]chest pain  [  ]shortness of breath  [  ]palpitations  [ X ]syncope  [ ]near syncope [ ]upper extremity weakness   [ ] lower extremity weakness  [  ]diplopia  [  ]altered mental status   [  ]fevers  [ ]chills [ ]nausea  [ ]vomitting  [  ]dysphagia    [ ]abdominal pain  [ ]melena  [ ]BRBPR    [  ]epistaxis  [  ]rash    [ ]lower extremity edema        [ X] All others negative	  [ ] Unable to obtain      LABS:	 	    CARDIAC MARKERS:  CARDIAC MARKERS ( 10 May 2019 11:36 )  0.038 ng/mL / x     / x     / x     / x                                  8.1    10.15 )-----------( 109      ( 11 May 2019 07:00 )             25.4     Hb Trend: 8.1<--    05-11    140  |  103  |  27<H>  ----------------------------<  85  3.6   |  31  |  4.32<H>    Ca    7.1<L>      11 May 2019 07:00  Phos  4.4     05-11  Mg     2.1     05-11    TPro  4.3<L>  /  Alb  1.9<L>  /  TBili  0.4  /  DBili  x   /  AST  25  /  ALT  13  /  AlkPhos  110  05-10    Creatinine Trend: 4.32<--, 4.06<--, 3.86<--    TSH: Thyroid Stimulating Hormone, Serum: 3.97 uU/mL (05-11 @ 07:00)      PHYSICAL EXAM:  T(C): 36.4 (05-11-19 @ 07:56), Max: 36.6 (05-10-19 @ 15:30)  HR: 73 (05-11-19 @ 07:56) (70 - 80)  BP: 117/41 (05-11-19 @ 07:56) (104/38 - 126/54)  RR: 18 (05-11-19 @ 07:56) (14 - 18)  SpO2: 96% (05-11-19 @ 07:56) (95% - 98%)  Wt(kg): --  I&O's Summary    10 May 2019 07:01  -  11 May 2019 07:00  --------------------------------------------------------  IN: 420 mL / OUT: 0 mL / NET: 420 mL    11 May 2019 07:01  -  11 May 2019 12:08  --------------------------------------------------------  IN: 118 mL / OUT: 0 mL / NET: 118 mL        Gen: Appears well in NAD  HEENT:  (-)icterus (-)pallor  CV: N S1 S2 1/6 ELYSSA (+)2 Pulses B/l  Resp:  Clear to ausculatation B/L, normal effort  GI: (+) BS Soft, NT, ND  Lymph:  (-)Edema, (-)obvious lymphadenopathy  Skin: Warm to touch, Normal turgor  Psych: Appropriate mood and affect        TELEMETRY: 	Sinus       ECG:  	Sinus 84 BPM, Low voltage    RADIOLOGY:         CXR:   < from: Xray Chest 1 View-PORTABLE IMMEDIATE (05.10.19 @ 11:10) >  light blunting of left lateral costophrenic angle. Other   findings as above.    < end of copied text >      ASSESSMENT/PLAN: 	84y Female PMHX ESRD on HD. MM on chemo, (HAS RIGHT PERMACATH AND LEFT MEDIPORT)  Cdiff , DVT comes to ED for syncopal episode.    - echo  - orthostatic BP  - Cont to monitor on tele, if unrevealing will need out pt event monitor    Theodore Álvarez MD, [facility]  BEEPER [phone]

## 2019-05-11 NOTE — PROGRESS NOTE ADULT - PROBLEM SELECTOR PLAN 2
Potassium of 2.7  Will hold patient Lasix 80mg daily for now  Received Oral Potassium  Check repeat BMP On admission K 2.7  - Holding lasix  - Replaced but still low - may be because patient missed HD  - F/u BMP qd

## 2019-05-11 NOTE — PROGRESS NOTE ADULT - PROBLEM SELECTOR PLAN 5
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

## 2019-05-11 NOTE — PROGRESS NOTE ADULT - PROBLEM SELECTOR PLAN 7
History of MM on chemo   Primary care team to contact Heme Oncologist Dr Nate Singh  History of MM on chemo   - Will contact Heme Oncologist Dr Nate Singh

## 2019-05-12 LAB
ANION GAP SERPL CALC-SCNC: 6 MMOL/L — SIGNIFICANT CHANGE UP (ref 5–17)
BASOPHILS # BLD AUTO: 0.01 K/UL — SIGNIFICANT CHANGE UP (ref 0–0.2)
BASOPHILS NFR BLD AUTO: 0.1 % — SIGNIFICANT CHANGE UP (ref 0–2)
BUN SERPL-MCNC: 16 MG/DL — SIGNIFICANT CHANGE UP (ref 7–18)
CALCIUM SERPL-MCNC: 7.1 MG/DL — LOW (ref 8.4–10.5)
CHLORIDE SERPL-SCNC: 107 MMOL/L — SIGNIFICANT CHANGE UP (ref 96–108)
CO2 SERPL-SCNC: 30 MMOL/L — SIGNIFICANT CHANGE UP (ref 22–31)
CREAT SERPL-MCNC: 3.04 MG/DL — HIGH (ref 0.5–1.3)
EOSINOPHIL # BLD AUTO: 0 K/UL — SIGNIFICANT CHANGE UP (ref 0–0.5)
EOSINOPHIL NFR BLD AUTO: 0 % — SIGNIFICANT CHANGE UP (ref 0–6)
GLUCOSE BLDC GLUCOMTR-MCNC: 107 MG/DL — HIGH (ref 70–99)
GLUCOSE SERPL-MCNC: 93 MG/DL — SIGNIFICANT CHANGE UP (ref 70–99)
HCT VFR BLD CALC: 25.2 % — LOW (ref 34.5–45)
HGB BLD-MCNC: 8.1 G/DL — LOW (ref 11.5–15.5)
IMM GRANULOCYTES NFR BLD AUTO: 0.4 % — SIGNIFICANT CHANGE UP (ref 0–1.5)
LYMPHOCYTES # BLD AUTO: 1.01 K/UL — SIGNIFICANT CHANGE UP (ref 1–3.3)
LYMPHOCYTES # BLD AUTO: 12.7 % — LOW (ref 13–44)
MCHC RBC-ENTMCNC: 31.8 PG — SIGNIFICANT CHANGE UP (ref 27–34)
MCHC RBC-ENTMCNC: 32.1 GM/DL — SIGNIFICANT CHANGE UP (ref 32–36)
MCV RBC AUTO: 98.8 FL — SIGNIFICANT CHANGE UP (ref 80–100)
MONOCYTES # BLD AUTO: 1.2 K/UL — HIGH (ref 0–0.9)
MONOCYTES NFR BLD AUTO: 15.1 % — HIGH (ref 2–14)
NEUTROPHILS # BLD AUTO: 5.7 K/UL — SIGNIFICANT CHANGE UP (ref 1.8–7.4)
NEUTROPHILS NFR BLD AUTO: 71.7 % — SIGNIFICANT CHANGE UP (ref 43–77)
NRBC # BLD: 0 /100 WBCS — SIGNIFICANT CHANGE UP (ref 0–0)
PLATELET # BLD AUTO: 102 K/UL — LOW (ref 150–400)
POTASSIUM SERPL-MCNC: 3.8 MMOL/L — SIGNIFICANT CHANGE UP (ref 3.5–5.3)
POTASSIUM SERPL-SCNC: 3.8 MMOL/L — SIGNIFICANT CHANGE UP (ref 3.5–5.3)
RBC # BLD: 2.55 M/UL — LOW (ref 3.8–5.2)
RBC # FLD: 21.2 % — HIGH (ref 10.3–14.5)
SODIUM SERPL-SCNC: 143 MMOL/L — SIGNIFICANT CHANGE UP (ref 135–145)
WBC # BLD: 7.95 K/UL — SIGNIFICANT CHANGE UP (ref 3.8–10.5)
WBC # FLD AUTO: 7.95 K/UL — SIGNIFICANT CHANGE UP (ref 3.8–10.5)

## 2019-05-12 RX ORDER — MIDODRINE HYDROCHLORIDE 2.5 MG/1
5 TABLET ORAL THREE TIMES A DAY
Refills: 0 | Status: DISCONTINUED | OUTPATIENT
Start: 2019-05-12 | End: 2019-05-15

## 2019-05-12 RX ADMIN — Medication 200 MILLIGRAM(S): at 12:49

## 2019-05-12 RX ADMIN — Medication 125 MILLIGRAM(S): at 17:59

## 2019-05-12 RX ADMIN — ANASTROZOLE 1 MILLIGRAM(S): 1 TABLET ORAL at 12:49

## 2019-05-12 RX ADMIN — Medication 125 MILLIGRAM(S): at 23:28

## 2019-05-12 RX ADMIN — BRIMONIDINE TARTRATE 1 DROP(S): 2 SOLUTION/ DROPS OPHTHALMIC at 22:43

## 2019-05-12 RX ADMIN — APIXABAN 2.5 MILLIGRAM(S): 2.5 TABLET, FILM COATED ORAL at 17:59

## 2019-05-12 RX ADMIN — Medication 125 MILLIGRAM(S): at 12:49

## 2019-05-12 RX ADMIN — SEVELAMER CARBONATE 800 MILLIGRAM(S): 2400 POWDER, FOR SUSPENSION ORAL at 17:59

## 2019-05-12 RX ADMIN — MIDODRINE HYDROCHLORIDE 5 MILLIGRAM(S): 2.5 TABLET ORAL at 22:42

## 2019-05-12 RX ADMIN — BRIMONIDINE TARTRATE 1 DROP(S): 2 SOLUTION/ DROPS OPHTHALMIC at 14:50

## 2019-05-12 RX ADMIN — APIXABAN 2.5 MILLIGRAM(S): 2.5 TABLET, FILM COATED ORAL at 06:33

## 2019-05-12 RX ADMIN — BRIMONIDINE TARTRATE 1 DROP(S): 2 SOLUTION/ DROPS OPHTHALMIC at 06:33

## 2019-05-12 RX ADMIN — SIMVASTATIN 40 MILLIGRAM(S): 20 TABLET, FILM COATED ORAL at 22:43

## 2019-05-12 RX ADMIN — Medication 125 MILLIGRAM(S): at 06:34

## 2019-05-12 RX ADMIN — Medication 100 MILLIGRAM(S): at 12:49

## 2019-05-12 RX ADMIN — SEVELAMER CARBONATE 800 MILLIGRAM(S): 2400 POWDER, FOR SUSPENSION ORAL at 12:49

## 2019-05-12 RX ADMIN — SEVELAMER CARBONATE 800 MILLIGRAM(S): 2400 POWDER, FOR SUSPENSION ORAL at 08:20

## 2019-05-12 NOTE — PHYSICAL THERAPY INITIAL EVALUATION ADULT - CRITERIA FOR SKILLED THERAPEUTIC INTERVENTIONS
functional limitations in following categories/risk reduction/prevention/impairments found/therapy frequency/predicted duration of therapy intervention/rehab potential

## 2019-05-12 NOTE — PHYSICAL THERAPY INITIAL EVALUATION ADULT - GENERAL OBSERVATIONS, REHAB EVAL
Chart (EMR) reviewed. Received supine c HOB elevated, NAD. Speak limited English ([place]ian). Daughter (Lizeth) present. Alert. Ox4. Able to follow multistep commands/directions.

## 2019-05-12 NOTE — PHYSICAL THERAPY INITIAL EVALUATION ADULT - ADDITIONAL COMMENTS
Patient lives c daughter in a pvt house c 6 entry steps c L rail up, all amenities in the 1st floor. Has HHA 8 hrs/7 days. Required assistance c all ADL's and limited household ambulation with rolling walker. Patient has own rolling walker, wheelchair, and 3:1 commode.

## 2019-05-12 NOTE — PHYSICAL THERAPY INITIAL EVALUATION ADULT - IMPAIRMENTS FOUND, PT EVAL
posture/gait, locomotion, and balance/joint integrity and mobility/muscle strength/aerobic capacity/endurance

## 2019-05-12 NOTE — PROGRESS NOTE ADULT - SUBJECTIVE AND OBJECTIVE BOX
Subjective:  pt seen and examined, no complaints, ROS - .     acyclovir   Oral Tab/Cap 200 milliGRAM(s) Oral daily  allopurinol 100 milliGRAM(s) Oral daily  anastrozole 1 milliGRAM(s) Oral daily  apixaban 2.5 milliGRAM(s) Oral every 12 hours  brimonidine 0.2% Ophthalmic Solution 1 Drop(s) Both EYES every 8 hours  sevelamer carbonate 800 milliGRAM(s) Oral three times a day with meals  simvastatin 40 milliGRAM(s) Oral at bedtime  sodium chloride 0.9%. 1000 milliLiter(s) IV Continuous <Continuous>  vancomycin    Solution 125 milliGRAM(s) Oral every 6 hours                            8.1    7.95  )-----------( 102      ( 12 May 2019 06:19 )             25.2       Hemoglobin: 8.1 g/dL (05-12 @ 06:19)  Hemoglobin: 8.1 g/dL (05-11 @ 07:00)  Hemoglobin: 8.5 g/dL (05-10 @ 11:36)      05-12    143  |  107  |  16  ----------------------------<  93  3.8   |  30  |  3.04<H>    Ca    7.1<L>      12 May 2019 06:19  Phos  4.4     05-11  Mg     2.1     05-11    TPro  4.3<L>  /  Alb  1.9<L>  /  TBili  0.4  /  DBili  x   /  AST  25  /  ALT  13  /  AlkPhos  110  05-10    Creatinine Trend: 3.04<--, 4.32<--, 4.06<--, 3.86<--    COAGS:     CARDIAC MARKERS ( 10 May 2019 11:36 )  0.038 ng/mL / x     / x     / x     / x            T(C): 36.7 (05-12-19 @ 04:56), Max: 36.7 (05-12-19 @ 04:56)  HR: 72 (05-12-19 @ 04:56) (72 - 85)  BP: 113/49 (05-12-19 @ 04:56) (101/43 - 119/35)  RR: 18 (05-12-19 @ 04:56) (16 - 18)  SpO2: 98% (05-12-19 @ 04:56) (96% - 98%)  Wt(kg): --    I&O's Summary    11 May 2019 07:01  -  12 May 2019 07:00  --------------------------------------------------------  IN: 318 mL / OUT: 0 mL / NET: 318 mL      Appearance: Normal	  HEENT:   Normal oral mucosa, PERRL, EOMI	  Lymphatic: No lymphadenopathy , no edema  Cardiovascular: Normal S1 S2, No JVD, No murmurs , Peripheral pulses palpable 2+ bilaterally  Respiratory: Lungs clear to auscultation, normal effort 	  Gastrointestinal:  Soft, Non-tender, + BS	  Skin: No rashes, No ecchymoses, No cyanosis, warm to touch  Musculoskeletal: Normal range of motion, normal strength  Psychiatry:  Mood & affect appropriate    TELEMETRY: 	  nsr     DIAGNOSTIC TESTING:  [ ] Echocardiogram:   [ ]  Catheterization:  [ ] Stress Test:    OTHER: 	        ASSESSMENT/PLAN: 	84y Female PMHX ESRD on HD. MM on chemo, (HAS RIGHT PERMACATH AND LEFT MEDIPORT)  Cdiff , DVT comes to ED for syncopal episode.    - A/c with eliquis per home dose  - cont Abx / viral per medicine   - HD per renal   - echo pending   - orthostatic BP  * tele stable, nsr overnight   D/W Dr Arevalo

## 2019-05-12 NOTE — PROGRESS NOTE ADULT - SUBJECTIVE AND OBJECTIVE BOX
Patient is a 84y old  Female who presents with a chief complaint of Weakness and syncope (12 May 2019 07:50)/colitis/hypotension, add midodrine , monitor vital, ABD distention CT ABD      INTERVAL HPI/OVERNIGHT EVENTS:  T(C): 36.4 (05-12-19 @ 08:00), Max: 36.7 (05-12-19 @ 04:56)  HR: 81 (05-12-19 @ 08:00) (72 - 85)  BP: 109/47 (05-12-19 @ 08:00) (101/43 - 119/35)  RR: 17 (05-12-19 @ 08:00) (16 - 18)  SpO2: 97% (05-12-19 @ 08:00) (96% - 98%)  Wt(kg): --    LABS:                        8.1    7.95  )-----------( 102      ( 12 May 2019 06:19 )             25.2     05-12    143  |  107  |  16  ----------------------------<  93  3.8   |  30  |  3.04<H>    Ca    7.1<L>      12 May 2019 06:19  Phos  4.4     05-11  Mg     2.1     05-11    TPro  4.3<L>  /  Alb  1.9<L>  /  TBili  0.4  /  DBili  x   /  AST  25  /  ALT  13  /  AlkPhos  110  05-10    PT/INR - ( 10 May 2019 11:36 )   PT: 10.7 sec;   INR: 0.97 ratio         PTT - ( 10 May 2019 11:36 )  PTT:27.9 sec    CAPILLARY BLOOD GLUCOSE            RADIOLOGY & ADDITIONAL TESTS:    Consultant(s) Notes Reviewed:  [x ] YES  [ ] NO    PHYSICAL EXAM:  GENERAL: well built, well nourished  HEAD:  Atraumatic, Normocephalic  EYES: EOMI, PERRLA, conjunctiva and sclera clear  ENT: No tonsillar erythema, exudates, or enlargement; Moist mucous membranes, Good dentition, No lesions  NECK: Supple, No JVD, Normal thyroid, no enlarged nodes  NERVOUS SYSTEM:  Alert & Oriented X3, Good concentration; Motor Strength 5/5 B/L upper and lower extremities; DTRs 2+ intact and symmetric, sensory intact  CHEST/LUNG: B/L good air entry; No rales, rhonchi, or wheezing  HEART: S1S2 normal, no S3, Regular rate and rhythm; No murmurs, rubs, or gallops  ABDOMEN: Soft, Nontender, distended; Bowel sounds present  EXTREMITIES:  2+ Peripheral Pulses, No clubbing, cyanosis, positive edema  LYMPH: No lymphadenopathy noted  SKIN: No rashes or lesions    Care Discussed with Consultants/Other Providers [ x] YES  [ ] NO

## 2019-05-12 NOTE — PROGRESS NOTE ADULT - ASSESSMENT
HPI: 83 y/o Female from home with PMHX ESRD on HD. MM on chemo, Cdiff , DVT comes to ED for syncopal episode. Daughter at bedside  who states that pt had two witnessed syncopal events this morning. She says that her legs gave in and she grabbed her. No seizure like activity. Pt was sitting when occurred and dropped water bottle but did not fall, hit head or any trama from episodes. Daughter stated this happened once last year. otherwise pt has been fine. Denies nausea, vomiting, diarrhea, abdominal pain, SOB, chest pain, VU, palpitations, cough, wheezing, joint pain or swelling, fever, chills.    ED course: Patient examined at bedside in NAD AAOx3  VS T(C): 36.6 HR: 79 BP: 112/45 RR: 16 SpO2: 97% (  Physical exam as above     Imaging significant for:   Large-caliber double-lumen right jugular line is noted with tip in the   right atrial region.  Left-sided Wyprcn-q-Dutx noted with tip at the caval atrial junction.    Patient will be admitted to the telemetry due to syncopal episode  Hypotension, add midodrine, monitor vitals, ABD distention, order CT ABD  Problem/Plan - 1:  ·  Problem: Syncope and collapse.  Plan: S/p syncopal episode  - Denies chest pain, palpitations, SOB  - Cardiac syncope vs neurocardiogenic syncope vs medications in the setting of chemo vs dehydration in the setting of diarrhea (history of Cdiff) vs metabolic  - C/w tele  - Hypokalemic to 2.7 on admission, replaced, still low, patient missed HD yesterday  - EKG no ischemic changes  - Trop negative  - F/u echo  - Cardio Dr Álvarez.     Problem/Plan - 2:  ·  Problem: Hypokalemia.  Plan: On admission K 2.7  - Holding lasix  - Replaced but still low - may be because patient missed HD  - F/u BMP qd.   Problem/Plan - 3:  ·  Problem: Clostridium difficile colitis.  Plan: Patient with history of C diff on Vancomycin 125mg q6hrs to complete for 10 days.     Problem/Plan - 4:  ·  Problem: Deep vein thrombosis (DVT).  Plan: History of DVT of lower extremities  - C/w Eliquis.     Problem/Plan - 5:  ·  Problem: End stage renal disease.  Plan: ESRD on HD MWF  - Missed HD yesterday, will need today  - AVF on LUE  - Nephro Dr Jacobo.     Problem/Plan - 6:  Problem: HLD (hyperlipidemia). Plan: C/w Statin.    Problem/Plan - 7:  ·  Problem: Multiple myeloma.  Plan: History of MM on chemo   - Will contact Heme Oncologist Dr Nate Singh .     Problem/Plan - 8:  ·  Problem: Prophylactic measure.  Plan: IMPROVE VTE score: 9  C/W Eliquis

## 2019-05-12 NOTE — PHYSICAL THERAPY INITIAL EVALUATION ADULT - STANDING BALANCE: STATIC
fair minus Complex Repair And Dermal Autograft Text: The defect edges were debeveled with a #15 scalpel blade.  The primary defect was closed partially with a complex linear closure.  Given the location of the defect, shape of the defect and the proximity to free margins an dermal autograft was deemed most appropriate to repair the remaining defect.  The graft was trimmed to fit the size of the remaining defect.  The graft was then placed in the primary defect, oriented appropriately, and sutured into place.

## 2019-05-12 NOTE — PHYSICAL THERAPY INITIAL EVALUATION ADULT - ACTIVE RANGE OF MOTION EXAMINATION, REHAB EVAL
dominic. upper extremity Active ROM was WNL (within normal limits)/bilateral lower extremity Active ROM was WNL (within normal limits)

## 2019-05-13 DIAGNOSIS — R91.1 SOLITARY PULMONARY NODULE: ICD-10-CM

## 2019-05-13 DIAGNOSIS — D64.9 ANEMIA, UNSPECIFIED: ICD-10-CM

## 2019-05-13 LAB
ABO RH CONFIRMATION: SIGNIFICANT CHANGE UP
ALLERGY+IMMUNOLOGY DIAG STUDY NOTE: SIGNIFICANT CHANGE UP
ANION GAP SERPL CALC-SCNC: 3 MMOL/L — LOW (ref 5–17)
BASOPHILS # BLD AUTO: 0.01 K/UL — SIGNIFICANT CHANGE UP (ref 0–0.2)
BASOPHILS NFR BLD AUTO: 0.1 % — SIGNIFICANT CHANGE UP (ref 0–2)
BUN SERPL-MCNC: 25 MG/DL — HIGH (ref 7–18)
CALCIUM SERPL-MCNC: 7.2 MG/DL — LOW (ref 8.4–10.5)
CHLORIDE SERPL-SCNC: 105 MMOL/L — SIGNIFICANT CHANGE UP (ref 96–108)
CO2 SERPL-SCNC: 30 MMOL/L — SIGNIFICANT CHANGE UP (ref 22–31)
CREAT SERPL-MCNC: 3.81 MG/DL — HIGH (ref 0.5–1.3)
DIR ANTIGLOB POLYSPECIFIC INTERPRETATION: SIGNIFICANT CHANGE UP
EOSINOPHIL # BLD AUTO: 0 K/UL — SIGNIFICANT CHANGE UP (ref 0–0.5)
EOSINOPHIL NFR BLD AUTO: 0 % — SIGNIFICANT CHANGE UP (ref 0–6)
FERRITIN SERPL-MCNC: 940 NG/ML — HIGH (ref 15–150)
GLUCOSE SERPL-MCNC: 84 MG/DL — SIGNIFICANT CHANGE UP (ref 70–99)
HAPTOGLOB SERPL-MCNC: 93 MG/DL — SIGNIFICANT CHANGE UP (ref 34–200)
HCT VFR BLD CALC: 22.5 % — LOW (ref 34.5–45)
HGB BLD-MCNC: 7.3 G/DL — LOW (ref 11.5–15.5)
IMM GRANULOCYTES NFR BLD AUTO: 0.5 % — SIGNIFICANT CHANGE UP (ref 0–1.5)
IRON SATN MFR SERPL: 24 % — SIGNIFICANT CHANGE UP (ref 15–50)
IRON SATN MFR SERPL: 53 UG/DL — SIGNIFICANT CHANGE UP (ref 40–150)
LYMPHOCYTES # BLD AUTO: 1.2 K/UL — SIGNIFICANT CHANGE UP (ref 1–3.3)
LYMPHOCYTES # BLD AUTO: 16.1 % — SIGNIFICANT CHANGE UP (ref 13–44)
MCHC RBC-ENTMCNC: 32 PG — SIGNIFICANT CHANGE UP (ref 27–34)
MCHC RBC-ENTMCNC: 32.4 GM/DL — SIGNIFICANT CHANGE UP (ref 32–36)
MCV RBC AUTO: 98.7 FL — SIGNIFICANT CHANGE UP (ref 80–100)
MONOCYTES # BLD AUTO: 0.99 K/UL — HIGH (ref 0–0.9)
MONOCYTES NFR BLD AUTO: 13.3 % — SIGNIFICANT CHANGE UP (ref 2–14)
NEUTROPHILS # BLD AUTO: 5.23 K/UL — SIGNIFICANT CHANGE UP (ref 1.8–7.4)
NEUTROPHILS NFR BLD AUTO: 70 % — SIGNIFICANT CHANGE UP (ref 43–77)
NRBC # BLD: 0 /100 WBCS — SIGNIFICANT CHANGE UP (ref 0–0)
PLATELET # BLD AUTO: 82 K/UL — LOW (ref 150–400)
POTASSIUM SERPL-MCNC: 4 MMOL/L — SIGNIFICANT CHANGE UP (ref 3.5–5.3)
POTASSIUM SERPL-SCNC: 4 MMOL/L — SIGNIFICANT CHANGE UP (ref 3.5–5.3)
RBC # BLD: 2.28 M/UL — LOW (ref 3.8–5.2)
RBC # FLD: 21.2 % — HIGH (ref 10.3–14.5)
SODIUM SERPL-SCNC: 138 MMOL/L — SIGNIFICANT CHANGE UP (ref 135–145)
TIBC SERPL-MCNC: 224 UG/DL — LOW (ref 250–450)
TRANSFERRIN SERPL-MCNC: 179 MG/DL — LOW (ref 200–360)
UIBC SERPL-MCNC: 171 UG/DL — SIGNIFICANT CHANGE UP (ref 110–370)
WBC # BLD: 7.47 K/UL — SIGNIFICANT CHANGE UP (ref 3.8–10.5)
WBC # FLD AUTO: 7.47 K/UL — SIGNIFICANT CHANGE UP (ref 3.8–10.5)

## 2019-05-13 PROCEDURE — 74176 CT ABD & PELVIS W/O CONTRAST: CPT | Mod: 26

## 2019-05-13 RX ADMIN — APIXABAN 2.5 MILLIGRAM(S): 2.5 TABLET, FILM COATED ORAL at 17:37

## 2019-05-13 RX ADMIN — Medication 125 MILLIGRAM(S): at 05:18

## 2019-05-13 RX ADMIN — Medication 200 MILLIGRAM(S): at 12:46

## 2019-05-13 RX ADMIN — SEVELAMER CARBONATE 800 MILLIGRAM(S): 2400 POWDER, FOR SUSPENSION ORAL at 12:45

## 2019-05-13 RX ADMIN — BRIMONIDINE TARTRATE 1 DROP(S): 2 SOLUTION/ DROPS OPHTHALMIC at 13:10

## 2019-05-13 RX ADMIN — SIMVASTATIN 40 MILLIGRAM(S): 20 TABLET, FILM COATED ORAL at 21:31

## 2019-05-13 RX ADMIN — Medication 125 MILLIGRAM(S): at 23:25

## 2019-05-13 RX ADMIN — APIXABAN 2.5 MILLIGRAM(S): 2.5 TABLET, FILM COATED ORAL at 05:18

## 2019-05-13 RX ADMIN — ANASTROZOLE 1 MILLIGRAM(S): 1 TABLET ORAL at 12:46

## 2019-05-13 RX ADMIN — Medication 100 MILLIGRAM(S): at 12:46

## 2019-05-13 RX ADMIN — SEVELAMER CARBONATE 800 MILLIGRAM(S): 2400 POWDER, FOR SUSPENSION ORAL at 08:53

## 2019-05-13 RX ADMIN — Medication 125 MILLIGRAM(S): at 12:45

## 2019-05-13 RX ADMIN — SEVELAMER CARBONATE 800 MILLIGRAM(S): 2400 POWDER, FOR SUSPENSION ORAL at 17:37

## 2019-05-13 RX ADMIN — BRIMONIDINE TARTRATE 1 DROP(S): 2 SOLUTION/ DROPS OPHTHALMIC at 05:18

## 2019-05-13 RX ADMIN — BRIMONIDINE TARTRATE 1 DROP(S): 2 SOLUTION/ DROPS OPHTHALMIC at 21:32

## 2019-05-13 RX ADMIN — Medication 125 MILLIGRAM(S): at 19:13

## 2019-05-13 NOTE — PROGRESS NOTE ADULT - ASSESSMENT
HPI: 83 y/o Female from home with PMHX ESRD on HD. MM on chemo, Cdiff , DVT comes to ED for syncopal episode. Daughter at bedside  who states that pt had two witnessed syncopal events this morning. She says that her legs gave in and she grabbed her. No seizure like activity. Pt was sitting when occurred and dropped water bottle but did not fall, hit head or any trama from episodes. Daughter stated this happened once last year. otherwise pt has been fine. Denies nausea, vomiting, diarrhea, abdominal pain, SOB, chest pain, VU, palpitations, cough, wheezing, joint pain or swelling, fever, chills.    ED course: Patient examined at bedside in NAD AAOx3  VS T(C): 36.6 HR: 79 BP: 112/45 RR: 16 SpO2: 97% (  Physical exam as above     Imaging significant for:   Large-caliber double-lumen right jugular line is noted with tip in the   right atrial region.  Left-sided Uqyvtu-b-Ajdi noted with tip at the caval atrial junction.    Patient will be admitted to the telemetry due to syncopal episode HPI: 83 y/o Female from home with PMHX ESRD on HD. MM on chemo, Cdiff , DVT comes to ED for syncopal episode. Daughter at bedside  who states that pt had two witnessed syncopal events this morning. She says that her legs gave in and she grabbed her. No seizure like activity. Pt was sitting when occurred and dropped water bottle but did not fall, hit head or any trama from episodes. Daughter stated this happened once last year. otherwise pt has been fine. Denies nausea, vomiting, diarrhea, abdominal pain, SOB, chest pain, VU, palpitations, cough, wheezing, joint pain or swelling, fever, chills.    ED course: Patient examined at bedside in NAD AAOx3  VS T(C): 36.6 HR: 79 BP: 112/45 RR: 16 SpO2: 97%  Physical exam as above     Imaging significant for:   Large-caliber double-lumen right jugular line is noted with tip in the   right atrial region.  Left-sided Cghiwe-r-Rugs noted with tip at the caval atrial junction.    Patient will be admitted to the telemetry due to syncopal episode

## 2019-05-13 NOTE — PROGRESS NOTE ADULT - PROBLEM SELECTOR PLAN 7
History of MM on chemo   - Will contact Heme Oncologist Dr Nate Singh  ESRD on HD MWF  - Missed HD last Friday, got HD on Saturday  [place] [facility] [place] Dr Jacobo

## 2019-05-13 NOTE — PROGRESS NOTE ADULT - SUBJECTIVE AND OBJECTIVE BOX
Subjective:  pt seen and examined, no complaints, ROS - .     acyclovir   Oral Tab/Cap 200 milliGRAM(s) Oral daily  allopurinol 100 milliGRAM(s) Oral daily  anastrozole 1 milliGRAM(s) Oral daily  apixaban 2.5 milliGRAM(s) Oral every 12 hours  brimonidine 0.2% Ophthalmic Solution 1 Drop(s) Both EYES every 8 hours  midodrine 5 milliGRAM(s) Oral three times a day  sevelamer carbonate 800 milliGRAM(s) Oral three times a day with meals  simvastatin 40 milliGRAM(s) Oral at bedtime  sodium chloride 0.9%. 1000 milliLiter(s) IV Continuous <Continuous>  vancomycin    Solution 125 milliGRAM(s) Oral every 6 hours                            8.1    7.95  )-----------( 102      ( 12 May 2019 06:19 )             25.2       Hemoglobin: 8.1 g/dL (05-12 @ 06:19)  Hemoglobin: 8.1 g/dL (05-11 @ 07:00)  Hemoglobin: 8.5 g/dL (05-10 @ 11:36)      05-12    143  |  107  |  16  ----------------------------<  93  3.8   |  30  |  3.04<H>    Ca    7.1<L>      12 May 2019 06:19      Creatinine Trend: 3.04<--, 4.32<--, 4.06<--, 3.86<--    COAGS:     CARDIAC MARKERS ( 10 May 2019 11:36 )  0.038 ng/mL / x     / x     / x     / x            T(C): 36.6 (05-13-19 @ 04:46), Max: 36.7 (05-12-19 @ 23:58)  HR: 82 (05-13-19 @ 04:46) (76 - 86)  BP: 140/43 (05-13-19 @ 04:46) (109/47 - 140/43)  RR: 18 (05-13-19 @ 04:46) (17 - 20)  SpO2: 97% (05-13-19 @ 04:46) (97% - 100%)  Wt(kg): --    I&O's Summary    Appearance: Normal	  HEENT:   Normal oral mucosa, PERRL, EOMI	  Lymphatic: No lymphadenopathy , no edema  Cardiovascular: Normal S1 S2, No JVD, No murmurs , Peripheral pulses palpable 2+ bilaterally  Respiratory: Lungs clear to auscultation, normal effort 	  Gastrointestinal:  Soft, Non-tender, + BS	  Skin: No rashes, No ecchymoses, No cyanosis, warm to touch  Musculoskeletal: Normal range of motion, normal strength  Psychiatry:  Mood & affect appropriate    TELEMETRY: 	  nsr     DIAGNOSTIC TESTING:  [ ] Echocardiogram:    [ ]  Catheterization:  [ ] Stress Test:    OTHER: 	        ASSESSMENT/PLAN: 	84y Female PMHX ESRD on HD. MM on chemo, (HAS RIGHT PERMACATH AND LEFT MEDIPORT)  Cdiff , DVT comes to ED for syncopal episode.    - A/c with eliquis    - follow up on cultures   - cont Abx / viral per medicine   - HD per renal   - echo pending   - orthostatic BP- stable , cont proamatine   NO arrythmia on tele   D/W Dr Arevalo Subjective:  pt seen and examined, no complaints, ROS - .     acyclovir   Oral Tab/Cap 200 milliGRAM(s) Oral daily  allopurinol 100 milliGRAM(s) Oral daily  anastrozole 1 milliGRAM(s) Oral daily  apixaban 2.5 milliGRAM(s) Oral every 12 hours  brimonidine 0.2% Ophthalmic Solution 1 Drop(s) Both EYES every 8 hours  midodrine 5 milliGRAM(s) Oral three times a day  sevelamer carbonate 800 milliGRAM(s) Oral three times a day with meals  simvastatin 40 milliGRAM(s) Oral at bedtime  sodium chloride 0.9%. 1000 milliLiter(s) IV Continuous <Continuous>  vancomycin    Solution 125 milliGRAM(s) Oral every 6 hours                            8.1    7.95  )-----------( 102      ( 12 May 2019 06:19 )             25.2       Hemoglobin: 8.1 g/dL (05-12 @ 06:19)  Hemoglobin: 8.1 g/dL (05-11 @ 07:00)  Hemoglobin: 8.5 g/dL (05-10 @ 11:36)      05-12    143  |  107  |  16  ----------------------------<  93  3.8   |  30  |  3.04<H>    Ca    7.1<L>      12 May 2019 06:19      Creatinine Trend: 3.04<--, 4.32<--, 4.06<--, 3.86<--    COAGS:     CARDIAC MARKERS ( 10 May 2019 11:36 )  0.038 ng/mL / x     / x     / x     / x            T(C): 36.6 (05-13-19 @ 04:46), Max: 36.7 (05-12-19 @ 23:58)  HR: 82 (05-13-19 @ 04:46) (76 - 86)  BP: 140/43 (05-13-19 @ 04:46) (109/47 - 140/43)  RR: 18 (05-13-19 @ 04:46) (17 - 20)  SpO2: 97% (05-13-19 @ 04:46) (97% - 100%)  Wt(kg): --    I&O's Summary    Appearance: Normal	  HEENT:   Normal oral mucosa, PERRL, EOMI	  Lymphatic: No lymphadenopathy , no edema  Cardiovascular: Normal S1 S2, No JVD, No murmurs , Peripheral pulses palpable 2+ bilaterally  Respiratory: Lungs clear to auscultation, normal effort 	  Gastrointestinal:  Soft, Non-tender, + BS	  Skin: No rashes, No ecchymoses, No cyanosis, warm to touch  Musculoskeletal: Normal range of motion, normal strength  Psychiatry:  Mood & affect appropriate    TELEMETRY: 	  nsr     DIAGNOSTIC TESTING:  [ ] Echocardiogram:    [ ]  Catheterization:  [ ] Stress Test:    OTHER: 	        ASSESSMENT/PLAN: 	84y Female PMHX ESRD on HD. MM on chemo, (HAS RIGHT PERMACATH AND LEFT MEDIPORT)  Cdiff , DVT comes to ED for syncopal episode.    - A/c with eliquis    - follow up on cultures   - cont Abx / viral per medicine   - HD per renal   - echo pending   - orthostatic BP- stable , cont proamatine   NO arrythmia on tele   D/W Dr Álvarez

## 2019-05-13 NOTE — PROGRESS NOTE ADULT - ASSESSMENT
HPI: 83 y/o Female from home with PMHX ESRD on HD. MM on chemo, Cdiff , DVT comes to ED for syncopal episode. Daughter at bedside  who states that pt had two witnessed syncopal events this morning. She says that her legs gave in and she grabbed her. No seizure like activity. Pt was sitting when occurred and dropped water bottle but did not fall, hit head or any trama from episodes. Daughter stated this happened once last year. otherwise pt has been fine. Denies nausea, vomiting, diarrhea, abdominal pain, SOB, chest pain, VU, palpitations, cough, wheezing, joint pain or swelling, fever, chills.    ED course: Patient examined at bedside in NAD AAOx3  VS T(C): 36.6 HR: 79 BP: 112/45 RR: 16 SpO2: 97% (  Physical exam as above     Imaging significant for:   Large-caliber double-lumen right jugular line is noted with tip in the   right atrial region.  Left-sided Nomdtb-s-Fkau noted with tip at the caval atrial junction.    Patient will be admitted to the telemetry due to syncopal episode  Hypotension, add midodrine, monitor vitals, ABD distention, F/U CT ABD result, drop H/H will transfusion one unit blood during HD  Problem/Plan - 1:  ·  Problem: Syncope and collapse.  Plan: S/p syncopal episode  - Denies chest pain, palpitations, SOB  - Cardiac syncope vs neurocardiogenic syncope vs medications in the setting of chemo vs   dehydration in the setting of diarrhea (history of Cdiff) vs metabolic  - C/w tele  - Hypokalemic to 2.7 on admission, replaced, still low, patient missed HD yesterday  - EKG no ischemic changes  - Trop negative  - F/u echo  - Cardio Dr Álvarez.     Problem/Plan - 2:  ·  Problem: Hypokalemia.  Plan: On admission K 2.7  - Holding lasix  - Replaced but still low - may be because patient missed HD  - F/u BMP qd.   Problem/Plan - 3:  ·  Problem: Clostridium difficile colitis.  Plan: Patient with history of C diff on Vancomycin 125mg q6hrs to complete for 10 days.     Problem/Plan - 4:  ·  Problem: Deep vein thrombosis (DVT).  Plan: History of DVT of lower extremities  - C/w Eliquis.     Problem/Plan - 5:  ·  Problem: End stage renal disease.  Plan: ESRD on HD MWF  - Missed HD yesterday, will need today  - AVF on [facility]  - Nephro Dr Jacobo.     Problem/Plan - 6:  Problem: HLD (hyperlipidemia). Plan: C/w Statin.    Problem/Plan - 7:  ·  Problem: Multiple myeloma.  Plan: History of MM on chemo   - Will contact Heme Oncologist Dr Nate Singh .   Problem/Plan - 8:  ·  Problem: Prophylactic measure.  Plan: IMPROVE VTE score: 9  C/W Eliquis

## 2019-05-13 NOTE — PROGRESS NOTE ADULT - PROBLEM SELECTOR PLAN 2
On admission K 2.7  - Holding lasix  - Replaced but still low - may be because patient missed HD  - F/u BMP qd On admission K 2.7  - Holding lasix  - Replaced but still low - may be because patient missed HD  - No indication for further tele  - F/u BMP qd

## 2019-05-13 NOTE — PROGRESS NOTE ADULT - ASSESSMENT
83 YO F w ESRD on HD MWF, MM on chemo, Cdiff , DVT comes to ED for syncopal episode.    A/P:  ESRD:  HD MWF  HD today  Renal diet    Anemia:  BT with HD today  No epogen sec to MM    Syncope:  follow up Cardiology    CKD-MBD  Hypocalcemia:  sec to low albumin, corrected calcium is normal.  Continue Renvela  Check PO4, PTH

## 2019-05-13 NOTE — PROGRESS NOTE ADULT - PROBLEM SELECTOR PLAN 5
ESRD on HD MWF  - Missed HD yesterday, will need today  - AVF on [facility]  - Nephro Dr Njeru Patient with history of C diff on Vancomycin 125mg q6hrs to complete for 10 days

## 2019-05-13 NOTE — PROGRESS NOTE ADULT - PROBLEM SELECTOR PLAN 1
S/p syncopal episode  - Denies chest pain, palpitations, SOB  - Cardiac syncope vs neurocardiogenic syncope vs medications in the setting of chemo vs dehydration in the setting of diarrhea (history of Cdiff) vs metabolic  - C/w tele  - Hypokalemic to 2.7 on admission, replaced, still low, patient missed HD yesterday  - EKG no ischemic changes  - Trop negative  - F/u echo  - Cardio Dr Álvarez

## 2019-05-13 NOTE — PROGRESS NOTE ADULT - PROBLEM SELECTOR PLAN 4
History of DVT of lower extremities  - C/w Eliquis RLL pulm nodule on CT a/p  - Recommend repeat CT in 3 months

## 2019-05-13 NOTE — PROGRESS NOTE ADULT - SUBJECTIVE AND OBJECTIVE BOX
Dr. Russo  Office [phone]  Cell [phone]  Laura GREGORY  Cell [phone]      Patient is a 84y old  Female who presents with a chief complaint of Weakness and syncope (13 May 2019 10:16)      Patient seen and examined at bedside. No chest pain/sob    VITALS:  T(F): 97.5 (05-13-19 @ 08:43), Max: 98.1 (05-12-19 @ 23:58)  HR: 76 (05-13-19 @ 08:43)  BP: 136/43 (05-13-19 @ 08:43)  RR: 18 (05-13-19 @ 08:43)  SpO2: 99% (05-13-19 @ 08:43)  Wt(kg): --        PHYSICAL EXAM:  Constitutional: NAD  Neck: No JVD  Respiratory: CTAB, no wheezes, rales or rhonchi  Cardiovascular: S1, S2, RRR  Gastrointestinal: BS+, soft, NT/ND  Extremities: No peripheral edema    Hospital Medications:   MEDICATIONS  (STANDING):  acyclovir   Oral Tab/Cap 200 milliGRAM(s) Oral daily  allopurinol 100 milliGRAM(s) Oral daily  anastrozole 1 milliGRAM(s) Oral daily  apixaban 2.5 milliGRAM(s) Oral every 12 hours  brimonidine 0.2% Ophthalmic Solution 1 Drop(s) Both EYES every 8 hours  midodrine 5 milliGRAM(s) Oral three times a day  sevelamer carbonate 800 milliGRAM(s) Oral three times a day with meals  simvastatin 40 milliGRAM(s) Oral at bedtime  sodium chloride 0.9%. 1000 milliLiter(s) (60 mL/Hr) IV Continuous <Continuous>  vancomycin    Solution 125 milliGRAM(s) Oral every 6 hours      LABS:  05-13    138  |  105  |  25<H>  ----------------------------<  84  4.0   |  30  |  3.81<H>    Ca    7.2<L>      13 May 2019 06:53      Creatinine Trend: 3.81 <--, 3.04 <--, 4.32 <--, 4.06 <--, 3.86 <--    Iron Total, Serum: 53 ug/dL (05-13 @ 10:31)  Iron - Total Binding Capacity.: 224 ug/dL (05-13 @ 10:31)                              7.3    7.47  )-----------( 82       ( 13 May 2019 06:53 )             22.5     Urine Studies:      Iron 53, TIBC 224, %sat 24      [05-13-19 @ 10:31]  HbA1c 5.7      [05-11-19 @ 11:45]  TSH 3.97      [05-11-19 @ 07:00]  Lipid: chol 111, , HDL 50, LDL 39      [05-11-19 @ 07:00]        RADIOLOGY & ADDITIONAL STUDIES:

## 2019-05-13 NOTE — PROGRESS NOTE ADULT - PROBLEM SELECTOR PLAN 3
Patient with history of C diff on Vancomycin 125mg q6hrs to complete for 10 days Likely 2/2 MM and ESRD  - Will give 1PRBC today  - CT a/p showing large ascites, anasarca, and fat stranding along the pancreas  - F/u anemia panel  - F/u FOBT

## 2019-05-13 NOTE — PROGRESS NOTE ADULT - SUBJECTIVE AND OBJECTIVE BOX
PGY1 Note discussed with Supervising Resident and Primary Attending.    Patient is a 84y old  Female who presents with a chief complaint of Weakness and syncope (13 May 2019 13:11)      INTERVAL HPI/OVERNIGHT EVENTS :  No acute overnight events. Patient seen and examined at bedside. Patient has no current complaints. Patient denies nausea, vomiting, diarrhea, chest pain, SOB, headache.  MEDICATIONS  (STANDING):  acyclovir   Oral Tab/Cap 200 milliGRAM(s) Oral daily  allopurinol 100 milliGRAM(s) Oral daily  anastrozole 1 milliGRAM(s) Oral daily  apixaban 2.5 milliGRAM(s) Oral every 12 hours  brimonidine 0.2% Ophthalmic Solution 1 Drop(s) Both EYES every 8 hours  midodrine 5 milliGRAM(s) Oral three times a day  sevelamer carbonate 800 milliGRAM(s) Oral three times a day with meals  simvastatin 40 milliGRAM(s) Oral at bedtime  sodium chloride 0.9%. 1000 milliLiter(s) (60 mL/Hr) IV Continuous <Continuous>  vancomycin    Solution 125 milliGRAM(s) Oral every 6 hours    MEDICATIONS  (PRN):      Allergies    No Known Allergies    Intolerances        REVIEW OF SYSTEMS :  * General: denies chills, fatigue  * Eyes: denies vision changes  * Respiratory: denies cough, SOB, wheezing  * Cardio: denies chest pain, palpitations  * GI: denies abd pain, nausea, vomiting, diarrhea  * : denies dysuria  * Neuro: denies headaches, numbness, weakness  * MSK: denies joint pain  * Skin: denies itching, rashes    Vital Signs Last 24 Hrs  T(C): 36.4 (13 May 2019 08:43), Max: 36.7 (12 May 2019 23:58)  T(F): 97.5 (13 May 2019 08:43), Max: 98.1 (12 May 2019 23:58)  HR: 76 (13 May 2019 08:43) (76 - 86)  BP: 136/43 (13 May 2019 08:43) (114/45 - 140/43)  BP(mean): --  RR: 18 (13 May 2019 08:43) (18 - 20)  SpO2: 99% (13 May 2019 08:43) (97% - 99%)    PHYSICAL EXAM :  * General: no acute distress, well-nourished, well-developed  * HEENT: atraumatic, normocephalic; moist mucus membranes; supple neck; no JVD  * CN: EOMI, PERRL  * Respiratory: cta b/l; no wheezes, rales, rhonchi  * Cardio: RRR; no appreciable murmurs, rubs, gallops  * Abd: soft, nontender, nondistended, +BS  * Neuro: AAOx3; strength 5/5; sensation intact throughout  * Extremities: no clubbing, cyanosis, edema  * Skin: no rashes    LABS:                          7.3    7.47  )-----------( 82       ( 13 May 2019 06:53 )             22.5     05-13    138  |  105  |  25<H>  ----------------------------<  84  4.0   |  30  |  3.81<H>    Ca    7.2<L>      13 May 2019 06:53          CAPILLARY BLOOD GLUCOSE      POCT Blood Glucose.: 107 mg/dL (12 May 2019 17:14)      RADIOLOGY & ADDITIONAL TESTS:   no new imaging    Imaging Personally Reviewed:    Consultant(s) Notes Reviewed: PGY1 Note discussed with Supervising Resident and Primary Attending.    Patient is a 84y old  Female who presents with a chief complaint of Weakness and syncope (13 May 2019 13:11)      INTERVAL HPI/OVERNIGHT EVENTS:  No acute overnight events. Patient seen and examined at bedside. Patient has no current complaints. Patient denies nausea, vomiting, diarrhea, chest pain, SOB, headache.    MEDICATIONS  (STANDING):  acyclovir   Oral Tab/Cap 200 milliGRAM(s) Oral daily  allopurinol 100 milliGRAM(s) Oral daily  anastrozole 1 milliGRAM(s) Oral daily  apixaban 2.5 milliGRAM(s) Oral every 12 hours  brimonidine 0.2% Ophthalmic Solution 1 Drop(s) Both EYES every 8 hours  midodrine 5 milliGRAM(s) Oral three times a day  sevelamer carbonate 800 milliGRAM(s) Oral three times a day with meals  simvastatin 40 milliGRAM(s) Oral at bedtime  sodium chloride 0.9%. 1000 milliLiter(s) (60 mL/Hr) IV Continuous <Continuous>  vancomycin    Solution 125 milliGRAM(s) Oral every 6 hours    MEDICATIONS  (PRN):      Allergies    No Known Allergies    Intolerances        REVIEW OF SYSTEMS:  * General: denies chills, fatigue  * Eyes: denies vision changes  * Respiratory: denies cough, SOB, wheezing  * Cardio: denies chest pain, palpitations  * GI: denies abd pain, nausea, vomiting, diarrhea  * : denies dysuria  * Neuro: denies headaches, numbness, weakness  * MSK: denies joint pain  * Skin: denies itching, rashes    Vital Signs Last 24 Hrs  T(C): 36.4 (13 May 2019 08:43), Max: 36.7 (12 May 2019 23:58)  T(F): 97.5 (13 May 2019 08:43), Max: 98.1 (12 May 2019 23:58)  HR: 76 (13 May 2019 08:43) (76 - 86)  BP: 136/43 (13 May 2019 08:43) (114/45 - 140/43)  BP(mean): --  RR: 18 (13 May 2019 08:43) (18 - 20)  SpO2: 99% (13 May 2019 08:43) (97% - 99%)    PHYSICAL EXAM:  * General: no acute distress, cachectic  * HEENT: atraumatic, normocephalic; moist mucus membranes; supple neck; no JVD  * CN: EOMI, PERRL  * Respiratory: cta b/l; no wheezes, rales, rhonchi  * Cardio: RRR; no appreciable murmurs, rubs, gallops  * Abd: soft, nontender, nondistended, +BS  * Neuro: AAOx3; strength 5/5; sensation intact throughout  * Extremities: no clubbing, cyanosis, edema  * Skin: no rashes    LABS:                          7.3    7.47  )-----------( 82       ( 13 May 2019 06:53 )             22.5     05-13    138  |  105  |  25<H>  ----------------------------<  84  4.0   |  30  |  3.81<H>    Ca    7.2<L>      13 May 2019 06:53          CAPILLARY BLOOD GLUCOSE      POCT Blood Glucose.: 107 mg/dL (12 May 2019 17:14)      RADIOLOGY & ADDITIONAL TESTS:   no new imaging    Imaging Personally Reviewed:    Consultant(s) Notes Reviewed:

## 2019-05-13 NOTE — PROGRESS NOTE ADULT - PROBLEM SELECTOR PLAN 8
IMPROVE VTE score: 9  C/W Eliquis     [x ] Previous VTE                                    3  [ ] Thrombophilia                                  2  [ x] Lower limb paralysis                        2  (unable to hold up >15 seconds)    [ x] Current Cancer (within 6 months)        2   [x] Immobilization > 24 hrs                    1  [ ] ICU/CCU stay > 24 hrs                      1  [x] Age > 60                                         1 C/w Statin

## 2019-05-13 NOTE — PROGRESS NOTE ADULT - SUBJECTIVE AND OBJECTIVE BOX
Patient is a 84y old  Female who presents with a chief complaint of Weakness and syncope (13 May 2019 07:13)/colitis/drop H/H, on eliquis, one unit blood transfusion during HD, F/U CT ABD      INTERVAL HPI/OVERNIGHT EVENTS:  T(C): 36.4 (05-13-19 @ 08:43), Max: 36.7 (05-12-19 @ 23:58)  HR: 76 (05-13-19 @ 08:43) (76 - 86)  BP: 136/43 (05-13-19 @ 08:43) (114/45 - 140/43)  RR: 18 (05-13-19 @ 08:43) (18 - 20)  SpO2: 99% (05-13-19 @ 08:43) (97% - 100%)  Wt(kg): --    LABS:                        7.3    7.47  )-----------( 82       ( 13 May 2019 06:53 )             22.5     05-13    138  |  105  |  25<H>  ----------------------------<  84  4.0   |  30  |  3.81<H>    Ca    7.2<L>      13 May 2019 06:53          CAPILLARY BLOOD GLUCOSE      POCT Blood Glucose.: 107 mg/dL (12 May 2019 17:14)        RADIOLOGY & ADDITIONAL TESTS:    Consultant(s) Notes Reviewed:  [x ] YES  [ ] NO    PHYSICAL EXAM:  GENERAL: well built, well nourished  HEAD:  Atraumatic, Normocephalic  EYES: EOMI, PERRLA, conjunctiva and sclera clear  ENT: No tonsillar erythema, exudates, or enlargement; Moist mucous membranes, Good dentition, No lesions  NECK: Supple, No JVD, Normal thyroid, no enlarged nodes  NERVOUS SYSTEM:  Alert & Oriented X3, Good concentration; Motor Strength 5/5 B/L upper and lower extremities; DTRs 2+ intact and symmetric, sensory intact  CHEST/LUNG: B/L good air entry; No rales, rhonchi, or wheezing  HEART: S1S2 normal, no S3, Regular rate and rhythm; No murmurs, rubs, or gallops  ABDOMEN: Soft, Nontender, distended; Bowel sounds present  EXTREMITIES:  2+ Peripheral Pulses, No clubbing, cyanosis, positive edema  LYMPH: No lymphadenopathy noted  SKIN: No rashes or lesions    Care Discussed with Consultants/Other Providers [ x] YES  [ ] NO

## 2019-05-14 LAB
ANION GAP SERPL CALC-SCNC: 9 MMOL/L — SIGNIFICANT CHANGE UP (ref 5–17)
BASOPHILS # BLD AUTO: 0.01 K/UL — SIGNIFICANT CHANGE UP (ref 0–0.2)
BASOPHILS NFR BLD AUTO: 0.1 % — SIGNIFICANT CHANGE UP (ref 0–2)
BUN SERPL-MCNC: 35 MG/DL — HIGH (ref 7–18)
CALCIUM SERPL-MCNC: 7.1 MG/DL — LOW (ref 8.4–10.5)
CHLORIDE SERPL-SCNC: 102 MMOL/L — SIGNIFICANT CHANGE UP (ref 96–108)
CO2 SERPL-SCNC: 26 MMOL/L — SIGNIFICANT CHANGE UP (ref 22–31)
CREAT SERPL-MCNC: 4.79 MG/DL — HIGH (ref 0.5–1.3)
EOSINOPHIL # BLD AUTO: 0 K/UL — SIGNIFICANT CHANGE UP (ref 0–0.5)
EOSINOPHIL NFR BLD AUTO: 0 % — SIGNIFICANT CHANGE UP (ref 0–6)
GLUCOSE SERPL-MCNC: 84 MG/DL — SIGNIFICANT CHANGE UP (ref 70–99)
HCT VFR BLD CALC: 28.6 % — LOW (ref 34.5–45)
HGB BLD-MCNC: 9.5 G/DL — LOW (ref 11.5–15.5)
IMM GRANULOCYTES NFR BLD AUTO: 0.5 % — SIGNIFICANT CHANGE UP (ref 0–1.5)
LYMPHOCYTES # BLD AUTO: 1.35 K/UL — SIGNIFICANT CHANGE UP (ref 1–3.3)
LYMPHOCYTES # BLD AUTO: 17.1 % — SIGNIFICANT CHANGE UP (ref 13–44)
MCHC RBC-ENTMCNC: 30.4 PG — SIGNIFICANT CHANGE UP (ref 27–34)
MCHC RBC-ENTMCNC: 33.2 GM/DL — SIGNIFICANT CHANGE UP (ref 32–36)
MCV RBC AUTO: 91.7 FL — SIGNIFICANT CHANGE UP (ref 80–100)
MONOCYTES # BLD AUTO: 1.12 K/UL — HIGH (ref 0–0.9)
MONOCYTES NFR BLD AUTO: 14.2 % — HIGH (ref 2–14)
NEUTROPHILS # BLD AUTO: 5.36 K/UL — SIGNIFICANT CHANGE UP (ref 1.8–7.4)
NEUTROPHILS NFR BLD AUTO: 68.1 % — SIGNIFICANT CHANGE UP (ref 43–77)
NRBC # BLD: 0 /100 WBCS — SIGNIFICANT CHANGE UP (ref 0–0)
PLATELET # BLD AUTO: 80 K/UL — LOW (ref 150–400)
POTASSIUM SERPL-MCNC: 4.4 MMOL/L — SIGNIFICANT CHANGE UP (ref 3.5–5.3)
POTASSIUM SERPL-SCNC: 4.4 MMOL/L — SIGNIFICANT CHANGE UP (ref 3.5–5.3)
RBC # BLD: 3.12 M/UL — LOW (ref 3.8–5.2)
RBC # FLD: 22.3 % — HIGH (ref 10.3–14.5)
SODIUM SERPL-SCNC: 137 MMOL/L — SIGNIFICANT CHANGE UP (ref 135–145)
WBC # BLD: 7.88 K/UL — SIGNIFICANT CHANGE UP (ref 3.8–10.5)
WBC # FLD AUTO: 7.88 K/UL — SIGNIFICANT CHANGE UP (ref 3.8–10.5)

## 2019-05-14 RX ADMIN — Medication 125 MILLIGRAM(S): at 05:32

## 2019-05-14 RX ADMIN — Medication 125 MILLIGRAM(S): at 23:10

## 2019-05-14 RX ADMIN — MIDODRINE HYDROCHLORIDE 5 MILLIGRAM(S): 2.5 TABLET ORAL at 14:38

## 2019-05-14 RX ADMIN — Medication 100 MILLIGRAM(S): at 12:19

## 2019-05-14 RX ADMIN — SIMVASTATIN 40 MILLIGRAM(S): 20 TABLET, FILM COATED ORAL at 21:15

## 2019-05-14 RX ADMIN — SEVELAMER CARBONATE 800 MILLIGRAM(S): 2400 POWDER, FOR SUSPENSION ORAL at 12:20

## 2019-05-14 RX ADMIN — BRIMONIDINE TARTRATE 1 DROP(S): 2 SOLUTION/ DROPS OPHTHALMIC at 21:15

## 2019-05-14 RX ADMIN — Medication 200 MILLIGRAM(S): at 12:19

## 2019-05-14 RX ADMIN — APIXABAN 2.5 MILLIGRAM(S): 2.5 TABLET, FILM COATED ORAL at 17:26

## 2019-05-14 RX ADMIN — Medication 125 MILLIGRAM(S): at 12:20

## 2019-05-14 RX ADMIN — BRIMONIDINE TARTRATE 1 DROP(S): 2 SOLUTION/ DROPS OPHTHALMIC at 14:38

## 2019-05-14 RX ADMIN — Medication 125 MILLIGRAM(S): at 17:27

## 2019-05-14 RX ADMIN — APIXABAN 2.5 MILLIGRAM(S): 2.5 TABLET, FILM COATED ORAL at 05:32

## 2019-05-14 RX ADMIN — SEVELAMER CARBONATE 800 MILLIGRAM(S): 2400 POWDER, FOR SUSPENSION ORAL at 17:26

## 2019-05-14 RX ADMIN — BRIMONIDINE TARTRATE 1 DROP(S): 2 SOLUTION/ DROPS OPHTHALMIC at 05:33

## 2019-05-14 RX ADMIN — ANASTROZOLE 1 MILLIGRAM(S): 1 TABLET ORAL at 12:19

## 2019-05-14 NOTE — PROGRESS NOTE ADULT - PROBLEM SELECTOR PLAN 1
S/p syncopal episode  - Denies chest pain, palpitations, SOB  - Cardiac syncope vs neurocardiogenic syncope vs medications in the setting of chemo vs dehydration in the setting of diarrhea (history of Cdiff) vs metabolic  - C/w tele  - Hypokalemic to 2.7 on admission, replaced, still low, patient missed HD yesterday  - EKG no ischemic changes  - Trop negative  - F/u echo  - Cardio Dr Álvarez S/p syncopal episode  - Denies chest pain, palpitations, SOB  - Cardiac syncope vs neurocardiogenic syncope vs medications in the setting of chemo vs dehydration in the setting of diarrhea (history of Cdiff) vs metabolic  - C/w tele  - Hypokalemic to 2.7 on admission, replaced, still low, patient missed HD yesterday  - EKG no ischemic changes  - Trop negative  - Echo with EF >55%  - Cardio Dr Álvarez  - Medically stable for dc pending placement

## 2019-05-14 NOTE — PROGRESS NOTE ADULT - ASSESSMENT
HPI: 85 y/o Female from home with PMHX ESRD on HD. MM on chemo, Cdiff , DVT comes to ED for syncopal episode. Daughter at bedside  who states that pt had two witnessed syncopal events this morning. She says that her legs gave in and she grabbed her. No seizure like activity. Pt was sitting when occurred and dropped water bottle but did not fall, hit head or any trama from episodes. Daughter stated this happened once last year. otherwise pt has been fine. Denies nausea, vomiting, diarrhea, abdominal pain, SOB, chest pain, VU, palpitations, cough, wheezing, joint pain or swelling, fever, chills.    ED course: Patient examined at bedside in NAD AAOx3  VS T(C): 36.6 HR: 79 BP: 112/45 RR: 16 SpO2: 97%  Physical exam as above     Imaging significant for:   Large-caliber double-lumen right jugular line is noted with tip in the   right atrial region.  Left-sided [id number] noted with tip at the caval atrial junction.    Patient will be admitted to the telemetry due to syncopal episode

## 2019-05-14 NOTE — ADVANCED PRACTICE NURSE CONSULT - RECOMMEDATIONS
-Clean the Perianal, Perineal, and Gluteal Fold areas with warm water, mild soap, pat dry, and apply skin prep to the surrounding skin  -Apply Antifungal Moisture Barrier Cream b.i.d. PRN  -Elevate/float the patients heels using heel protectors and reposition the patient Q 2hrs using wedges or pillows

## 2019-05-14 NOTE — PROGRESS NOTE ADULT - SUBJECTIVE AND OBJECTIVE BOX
PGY1 Note discussed with Supervising Resident and Primary Attending.    Patient is a 84y old  Female who presents with a chief complaint of Weakness and syncope/anasarca (14 May 2019 11:17)      INTERVAL HPI/OVERNIGHT EVENTS :  No acute overnight events. Patient seen and examined at bedside. Patient has no current complaints. Patient denies nausea, vomiting, diarrhea, chest pain, SOB, headache.  MEDICATIONS  (STANDING):  acyclovir   Oral Tab/Cap 200 milliGRAM(s) Oral daily  allopurinol 100 milliGRAM(s) Oral daily  anastrozole 1 milliGRAM(s) Oral daily  apixaban 2.5 milliGRAM(s) Oral every 12 hours  brimonidine 0.2% Ophthalmic Solution 1 Drop(s) Both EYES every 8 hours  midodrine 5 milliGRAM(s) Oral three times a day  sevelamer carbonate 800 milliGRAM(s) Oral three times a day with meals  simvastatin 40 milliGRAM(s) Oral at bedtime  vancomycin    Solution 125 milliGRAM(s) Oral every 6 hours    MEDICATIONS  (PRN):      Allergies    No Known Allergies    Intolerances        REVIEW OF SYSTEMS :  * General: denies chills, fatigue  * Eyes: denies vision changes  * Respiratory: denies cough, SOB, wheezing  * Cardio: denies chest pain, palpitations  * GI: denies abd pain, nausea, vomiting, diarrhea  * : denies dysuria  * Neuro: denies headaches, numbness, weakness  * MSK: denies joint pain  * Skin: denies itching, rashes    Vital Signs Last 24 Hrs  T(C): 36.7 (14 May 2019 08:15), Max: 36.7 (14 May 2019 08:15)  T(F): 98.1 (14 May 2019 08:15), Max: 98.1 (14 May 2019 08:15)  HR: 74 (14 May 2019 08:15) (74 - 83)  BP: 129/44 (14 May 2019 07:36) (109/41 - 147/54)  BP(mean): --  RR: 17 (14 May 2019 08:15) (17 - 18)  SpO2: 96% (14 May 2019 08:15) (96% - 100%)    PHYSICAL EXAM :  * General: no acute distress, well-nourished, well-developed  * HEENT: atraumatic, normocephalic; moist mucus membranes; supple neck; no JVD  * CN: EOMI, PERRL  * Respiratory: cta b/l; no wheezes, rales, rhonchi  * Cardio: RRR; no appreciable murmurs, rubs, gallops  * Abd: soft, nontender, nondistended, +BS  * Neuro: AAOx3; strength 5/5; sensation intact throughout  * Extremities: no clubbing, cyanosis, edema  * Skin: no rashes    LABS:                          9.5    7.88  )-----------( 80       ( 14 May 2019 07:17 )             28.6     05-14    137  |  102  |  35<H>  ----------------------------<  84  4.4   |  26  |  4.79<H>    Ca    7.1<L>      14 May 2019 07:17          CAPILLARY BLOOD GLUCOSE          RADIOLOGY & ADDITIONAL TESTS:   no new imaging    Imaging Personally Reviewed:    Consultant(s) Notes Reviewed: PGY1 Note discussed with Supervising Resident and Primary Attending.    Patient is a 84y old  Female who presents with a chief complaint of Weakness and syncope/anasarca (14 May 2019 11:17)      INTERVAL HPI/OVERNIGHT EVENTS:  No acute overnight events. Patient seen and examined at bedside. Patient has no current complaints. Patient denies nausea, vomiting, diarrhea, chest pain, SOB, headache. Medically stable for dc pending placement.    MEDICATIONS  (STANDING):  acyclovir   Oral Tab/Cap 200 milliGRAM(s) Oral daily  allopurinol 100 milliGRAM(s) Oral daily  anastrozole 1 milliGRAM(s) Oral daily  apixaban 2.5 milliGRAM(s) Oral every 12 hours  brimonidine 0.2% Ophthalmic Solution 1 Drop(s) Both EYES every 8 hours  midodrine 5 milliGRAM(s) Oral three times a day  sevelamer carbonate 800 milliGRAM(s) Oral three times a day with meals  simvastatin 40 milliGRAM(s) Oral at bedtime  vancomycin    Solution 125 milliGRAM(s) Oral every 6 hours    MEDICATIONS  (PRN):      Allergies    No Known Allergies    Intolerances        REVIEW OF SYSTEMS:  * General: denies chills, fatigue  * Eyes: denies vision changes  * Respiratory: denies cough, SOB, wheezing  * Cardio: denies chest pain, palpitations  * GI: denies abd pain, nausea, vomiting, diarrhea  * : denies dysuria  * Neuro: denies headaches, numbness, weakness  * MSK: denies joint pain  * Skin: denies itching, rashes    Vital Signs Last 24 Hrs  T(C): 36.7 (14 May 2019 08:15), Max: 36.7 (14 May 2019 08:15)  T(F): 98.1 (14 May 2019 08:15), Max: 98.1 (14 May 2019 08:15)  HR: 74 (14 May 2019 08:15) (74 - 83)  BP: 129/44 (14 May 2019 07:36) (109/41 - 147/54)  BP(mean): --  RR: 17 (14 May 2019 08:15) (17 - 18)  SpO2: 96% (14 May 2019 08:15) (96% - 100%)    PHYSICAL EXAM:  * General: no acute distress, well-nourished, well-developed  * HEENT: atraumatic, normocephalic; moist mucus membranes; supple neck; no JVD  * CN: EOMI, PERRL  * Respiratory: cta b/l; no wheezes, rales, rhonchi  * Cardio: RRR; no appreciable murmurs, rubs, gallops  * Abd: soft, nontender, nondistended, +BS  * Neuro: AAOx3; strength 5/5; sensation intact throughout  * Extremities: no clubbing, cyanosis, edema  * Skin: no rashes    LABS:                          9.5    7.88  )-----------( 80       ( 14 May 2019 07:17 )             28.6     05-14    137  |  102  |  35<H>  ----------------------------<  84  4.4   |  26  |  4.79<H>    Ca    7.1<L>      14 May 2019 07:17          CAPILLARY BLOOD GLUCOSE          RADIOLOGY & ADDITIONAL TESTS:   no new imaging    Consultant(s) Notes Reviewed: yes

## 2019-05-14 NOTE — PROGRESS NOTE ADULT - PROBLEM SELECTOR PLAN 7
ESRD on HD MWF  - Missed HD last Friday, got HD on Saturday  - AVF on [facility]  - Neph Dr Jacobo

## 2019-05-14 NOTE — ADVANCED PRACTICE NURSE CONSULT - ASSESSMENT
This is a 84yr old female patient admitted for Syncope and Collapse, presenting with the following:  -There is evidence of Incontinence Associated Dermatitis to the Perianal, Perineal, and Gluteal Fold areas	  -There is a Stage 1 Pressure Injury to the Bilateral Heels, as evident by non-blanchable erythema

## 2019-05-14 NOTE — PROGRESS NOTE ADULT - ASSESSMENT
83 YO F w ESRD on HD MWF, MM on chemo, Cdiff , DVT comes to ED for syncopal episode.    A/P:  ESRD:  HD MWF  s/p HD today  Renal diet    Anemia:  s/p BT  No epogen sec to MM  Heme evaluation for possible epogen    Syncope:  follow up Cardiology    CKD-MBD  Hypocalcemia:  sec to low albumin, corrected calcium is normal.  Continue Renvela  Check PO4, PTH

## 2019-05-14 NOTE — PROGRESS NOTE ADULT - PROBLEM SELECTOR PLAN 3
Likely 2/2 MM and ESRD  - Will give 1PRBC today  - CT a/p showing large ascites, anasarca, and fat stranding along the pancreas  - F/u anemia panel  - F/u FOBT Likely 2/2 MM and ESRD  - Will give 1PRBC today  - CT a/p showing large ascites, anasarca, and fat stranding along the pancreas  - Elevated ferritin

## 2019-05-14 NOTE — PROGRESS NOTE ADULT - SUBJECTIVE AND OBJECTIVE BOX
Subjective:  pt seen and examined, no complaints, no chest pain or palpitation     acyclovir   Oral Tab/Cap 200 milliGRAM(s) Oral daily  allopurinol 100 milliGRAM(s) Oral daily  anastrozole 1 milliGRAM(s) Oral daily  apixaban 2.5 milliGRAM(s) Oral every 12 hours  brimonidine 0.2% Ophthalmic Solution 1 Drop(s) Both EYES every 8 hours  midodrine 5 milliGRAM(s) Oral three times a day  sevelamer carbonate 800 milliGRAM(s) Oral three times a day with meals  simvastatin 40 milliGRAM(s) Oral at bedtime  sodium chloride 0.9%. 1000 milliLiter(s) IV Continuous <Continuous>  vancomycin    Solution 125 milliGRAM(s) Oral every 6 hours                            7.3    7.47  )-----------( 82       ( 13 May 2019 06:53 )             22.5       Hemoglobin: 7.3 g/dL (05-13 @ 06:53)  Hemoglobin: 8.1 g/dL (05-12 @ 06:19)  Hemoglobin: 8.1 g/dL (05-11 @ 07:00)  Hemoglobin: 8.5 g/dL (05-10 @ 11:36)      05-13    138  |  105  |  25<H>  ----------------------------<  84  4.0   |  30  |  3.81<H>    Ca    7.2<L>      13 May 2019 06:53      Creatinine Trend: 3.81<--, 3.04<--, 4.32<--, 4.06<--, 3.86<--    COAGS:           T(C): 36.4 (05-14-19 @ 05:06), Max: 36.4 (05-13-19 @ 08:43)  HR: 83 (05-14-19 @ 05:06) (76 - 83)  BP: 131/49 (05-14-19 @ 05:06) (109/41 - 147/54)  RR: 18 (05-14-19 @ 05:06) (18 - 18)  SpO2: 97% (05-14-19 @ 05:06) (96% - 100%)  Wt(kg): --    I&O's Summary    Appearance: Normal	  HEENT:   Normal oral mucosa, PERRL, EOMI	  Lymphatic: No lymphadenopathy , no edema  Cardiovascular: Normal S1 S2, No JVD, No murmurs , Peripheral pulses palpable 2+ bilaterally  Respiratory: Lungs clear to auscultation, normal effort 	  Gastrointestinal:  Soft, Non-tender, + BS	  Skin: No rashes, No ecchymoses, No cyanosis, warm to touch  Musculoskeletal: Normal range of motion, normal strength  Psychiatry:  Mood & affect appropriate    TELEMETRY: 	  off    DIAGNOSTIC TESTING:  [ ] Echocardiogram:   < from: Transthoracic Echocardiogram (05.11.19 @ 06:48) >  CONCLUSIONS:  1. Mild mitral regurgitation.  2.  Trace aortic regurgitation.  3. Severely dilated left atrium.  LA volume index = 49  cc/m2.  4. Increased relative wall thickness with normal left  ventricular (LV) mass index, consistent with concentric LV  remodeling.  5. Normal left ventricular systolic function.  6. Normal right ventricular size and function.  7. Bilateral pleural effusions.  8. Abdominal ascites is incidentally noted.  Consider  dedicated abdominal imaging.  ------------------------------------------------------------------------  Confirmed on  5/13/2019 - 09:27:01 by Theodore Álvarez MD  ------------------------------------------------------------------------    < end of copied text >    [ ]  Catheterization:  [ ] Stress Test:    OTHER: 	        ASSESSMENT/PLAN: 	84y Female PMHX ESRD on HD. MM on chemo, (HAS RIGHT PERMACATH AND LEFT MEDIPORT)  Cdiff , DVT comes to ED for syncopal episode.    - A/c with eliquis    - HD per renal - aggressive fluid removal , dominic Pleural effusion ,   - ECHO noted , Severe LAE, normal LV fx ,    - cont proamatine   -  GI / DVT prophylaxis  keep K>4, mag >2.0    - Abx per medicine   D/W Dr Álvarze

## 2019-05-14 NOTE — PROGRESS NOTE ADULT - SUBJECTIVE AND OBJECTIVE BOX
Patient is a 84y old  Female who presents with a chief complaint of Weakness and syncope (14 May 2019 06:21)/colitis/anasarca/anemia blood transfusion      INTERVAL HPI/OVERNIGHT EVENTS:  T(C): 36.7 (05-14-19 @ 08:15), Max: 36.7 (05-14-19 @ 08:15)  HR: 74 (05-14-19 @ 08:15) (74 - 83)  BP: 129/44 (05-14-19 @ 07:36) (109/41 - 147/54)  RR: 17 (05-14-19 @ 08:15) (17 - 18)  SpO2: 96% (05-14-19 @ 08:15) (96% - 100%)  Wt(kg): --    LABS:                        9.5    7.88  )-----------( 80       ( 14 May 2019 07:17 )             28.6     05-14    137  |  102  |  35<H>  ----------------------------<  84  4.4   |  26  |  4.79<H>    Ca    7.1<L>      14 May 2019 07:17          CAPILLARY BLOOD GLUCOSE            RADIOLOGY & ADDITIONAL TESTS:  < from: CT Abdomen and Pelvis No Cont (05.13.19 @ 10:08) >    Moderate to large amount of abdominal and pelvic ascites.    Mild fat stranding adjacent to the pancreas which may be related to the   ascites, however correlation is recommended with pancreatic enzymes to   exclude pancreatitis.    Anasarca.    Small bilateral pleural effusions with associated compressive atelectasis   in both lower lobes.    < end of copied text >  < from: Transthoracic Echocardiogram (05.11.19 @ 06:48) >  CONCLUSIONS:  1. Mild mitral regurgitation.  2.  Trace aortic regurgitation.  3. Severely dilated left atrium.  LA volume index = 49  cc/m2.  4. Increased relative wall thickness with normal left  ventricular (LV) mass index, consistent with concentric LV  remodeling.  5. Normal left ventricular systolic function.  6. Normal right ventricular size and function.  7. Bilateral pleural effusions.  8. Abdominal ascites is incidentally noted.  Consider  dedicated abdominal imaging.    < end of copied text >    Consultant(s) Notes Reviewed:  [x ] YES  [ ] NO    PHYSICAL EXAM:  GENERAL: well built, well nourished  HEAD:  Atraumatic, Normocephalic  EYES: EOMI, PERRLA, conjunctiva and sclera clear  ENT: No tonsillar erythema, exudates, or enlargement; Moist mucous membranes, Good dentition, No lesions  NECK: Supple, No JVD, Normal thyroid, no enlarged nodes  NERVOUS SYSTEM:  Alert & Oriented X3, Good concentration; Motor Strength 5/5 B/L upper and lower extremities; DTRs 2+ intact and symmetric, sensory intact  CHEST/LUNG: B/L good air entry; No rales, rhonchi, or wheezing  HEART: S1S2 normal, no S3, Regular rate and rhythm; No murmurs, rubs, or gallops  ABDOMEN: Soft, Nontender, distended; Bowel sounds present, positive ascites  EXTREMITIES:  2+ Peripheral Pulses, No clubbing, cyanosis, positive edema  LYMPH: No lymphadenopathy noted  SKIN: No rashes or lesions    Care Discussed with Consultants/Other Providers [ x] YES  [ ] NO

## 2019-05-14 NOTE — PROGRESS NOTE ADULT - ASSESSMENT
HPI: 85 y/o Female from home with PMHX ESRD on HD. MM on chemo, Cdiff , DVT comes to ED for syncopal episode. Daughter at bedside  who states that pt had two witnessed syncopal events this morning. She says that her legs gave in and she grabbed her. No seizure like activity. Pt was sitting when occurred and dropped water bottle but did not fall, hit head or any trama from episodes. Daughter stated this happened once last year. otherwise pt has been fine. Denies nausea, vomiting, diarrhea, abdominal pain, SOB, chest pain, VU, palpitations, cough, wheezing, joint pain or swelling, fever, chills.    ED course: Patient examined at bedside in NAD AAOx3  VS T(C): 36.6 HR: 79 BP: 112/45 RR: 16 SpO2: 97%  Physical exam as above     Imaging significant for:   Large-caliber double-lumen right jugular line is noted with tip in the   right atrial region.  Left-sided Lyachn-m-Axky noted with tip at the caval atrial junction.    Patient will be admitted to the telemetry due to syncopal episode  Patient improved, D/C planning to rehab and continue HD at NH, add lasix 80 mg po qd, midodrine 10 mg po tid, hepatitis panel  Problem/Plan - 1:  ·  Problem: Syncope and collapse.  Plan: S/p syncopal episode  - Denies chest pain, palpitations, SOB  - Cardiac syncope vs neurocardiogenic syncope vs medications in the setting of chemo vs dehydration in the setting of diarrhea (history of Cdiff) vs metabolic  - C/w tele  - Hypokalemic to 2.7 on admission, replaced, still low, patient missed HD yesterday  - EKG no ischemic changes  - Trop negative  - F/u echo  - Cardio Dr Álvarez.     Problem/Plan - 2:  ·  Problem: Hypokalemia.  Plan: On admission K 2.7  - Holding lasix  - Replaced but still low - may be because patient missed HD  - No indication for further tele  - F/u BMP qd.Problem/Plan - 3:  ·  Problem: Anemia. Plan: Likely 2/2 MM and ESRD  - Will give 1PRBC today  - CT a/p showing large ascites, anasarca, and fat stranding along the pancreas  - F/u anemia panel  - F/u FOBT.    Problem/Plan - 4:  ·  Problem: Pulmonary nodule. Plan: RLL pulm nodule on CT a/p  - Recommend repeat CT in 3 months.    Problem/Plan - 5:  ·  Problem: Clostridium difficile colitis. Plan: Patient with history of C diff on Vancomycin 125mg q6hrs to complete for 10 days.    Problem/Plan - 6:  Problem: Deep vein thrombosis (DVT).Plan: History of DVT of lower extremities  - C/w Eliquis.    Problem/Plan - 7:  ·  Problem: End stage renal disease. Plan: ESRD on HD MWF  - Missed HD last Friday, got HD on Saturday  - AVF on [facility] Jacobo.    Problem/Plan - 8:  ·  Problem: HLD (hyperlipidemia). Plan: C/w Statin.    Problem/Plan - 9:  ·  Problem: Multiple myeloma.  Plan: History of MM on chemo

## 2019-05-14 NOTE — PROGRESS NOTE ADULT - SUBJECTIVE AND OBJECTIVE BOX
Dr. Russo  Office [phone]  Cell [phone]  Laura GREGORY  Cell [phone]      Patient is a 84y old  Female who presents with a chief complaint of Weakness and syncope (14 May 2019 11:27)      Patient seen and examined at bedside. No chest pain/sob    VITALS:  T(F): 98.1 (05-14-19 @ 08:15), Max: 98.1 (05-14-19 @ 08:15)  HR: 74 (05-14-19 @ 08:15)  BP: 129/44 (05-14-19 @ 07:36)  RR: 17 (05-14-19 @ 08:15)  SpO2: 96% (05-14-19 @ 08:15)  Wt(kg): --        PHYSICAL EXAM:  Constitutional: NAD  Neck: No JVD  Respiratory: CTAB, no wheezes, rales or rhonchi  Cardiovascular: S1, S2, RRR  Gastrointestinal: BS+, soft, NT, distended  Extremities: 2+ peripheral edema    Hospital Medications:   MEDICATIONS  (STANDING):  acyclovir   Oral Tab/Cap 200 milliGRAM(s) Oral daily  allopurinol 100 milliGRAM(s) Oral daily  anastrozole 1 milliGRAM(s) Oral daily  apixaban 2.5 milliGRAM(s) Oral every 12 hours  brimonidine 0.2% Ophthalmic Solution 1 Drop(s) Both EYES every 8 hours  midodrine 5 milliGRAM(s) Oral three times a day  sevelamer carbonate 800 milliGRAM(s) Oral three times a day with meals  simvastatin 40 milliGRAM(s) Oral at bedtime  vancomycin    Solution 125 milliGRAM(s) Oral every 6 hours      LABS:  05-14    137  |  102  |  35<H>  ----------------------------<  84  4.4   |  26  |  4.79<H>    Ca    7.1<L>      14 May 2019 07:17      Creatinine Trend: 4.79 <--, 3.81 <--, 3.04 <--, 4.32 <--, 4.06 <--, 3.86 <--    Ferritin, Serum: 940 ng/mL (05-13 @ 14:24)                              9.5    7.88  )-----------( 80       ( 14 May 2019 07:17 )             28.6     Urine Studies:      Iron 53, TIBC 224, %sat 24      [05-13-19 @ 10:31]  Ferritin 940      [05-13-19 @ 14:24]  HbA1c 5.7      [05-11-19 @ 11:45]  TSH 3.97      [05-11-19 @ 07:00]  Lipid: chol 111, , HDL 50, LDL 39      [05-11-19 @ 07:00]        RADIOLOGY & ADDITIONAL STUDIES:

## 2019-05-14 NOTE — PROGRESS NOTE ADULT - PROBLEM SELECTOR PLAN 2
On admission K 2.7  - Holding lasix  - Replaced but still low - may be because patient missed HD  - No indication for further tele  - F/u BMP qd

## 2019-05-15 LAB
ANION GAP SERPL CALC-SCNC: 5 MMOL/L — SIGNIFICANT CHANGE UP (ref 5–17)
BASOPHILS # BLD AUTO: 0 K/UL — SIGNIFICANT CHANGE UP (ref 0–0.2)
BASOPHILS NFR BLD AUTO: 0 % — SIGNIFICANT CHANGE UP (ref 0–2)
BUN SERPL-MCNC: 30 MG/DL — HIGH (ref 7–18)
CALCIUM SERPL-MCNC: 7.1 MG/DL — LOW (ref 8.4–10.5)
CHLORIDE SERPL-SCNC: 103 MMOL/L — SIGNIFICANT CHANGE UP (ref 96–108)
CO2 SERPL-SCNC: 28 MMOL/L — SIGNIFICANT CHANGE UP (ref 22–31)
CREAT SERPL-MCNC: 3.91 MG/DL — HIGH (ref 0.5–1.3)
EOSINOPHIL # BLD AUTO: 0.07 K/UL — SIGNIFICANT CHANGE UP (ref 0–0.5)
EOSINOPHIL NFR BLD AUTO: 1 % — SIGNIFICANT CHANGE UP (ref 0–6)
GLUCOSE SERPL-MCNC: 97 MG/DL — SIGNIFICANT CHANGE UP (ref 70–99)
HAV IGM SER-ACNC: SIGNIFICANT CHANGE UP
HBV CORE IGM SER-ACNC: SIGNIFICANT CHANGE UP
HBV SURFACE AG SER-ACNC: SIGNIFICANT CHANGE UP
HCT VFR BLD CALC: 29.1 % — LOW (ref 34.5–45)
HGB BLD-MCNC: 9.7 G/DL — LOW (ref 11.5–15.5)
LYMPHOCYTES # BLD AUTO: 0.8 K/UL — LOW (ref 1–3.3)
LYMPHOCYTES # BLD AUTO: 11 % — LOW (ref 13–44)
MCHC RBC-ENTMCNC: 30.5 PG — SIGNIFICANT CHANGE UP (ref 27–34)
MCHC RBC-ENTMCNC: 33.3 GM/DL — SIGNIFICANT CHANGE UP (ref 32–36)
MCV RBC AUTO: 91.5 FL — SIGNIFICANT CHANGE UP (ref 80–100)
MONOCYTES # BLD AUTO: 0.95 K/UL — HIGH (ref 0–0.9)
MONOCYTES NFR BLD AUTO: 13 % — SIGNIFICANT CHANGE UP (ref 2–14)
NEUTROPHILS # BLD AUTO: 5.18 K/UL — SIGNIFICANT CHANGE UP (ref 1.8–7.4)
NEUTROPHILS NFR BLD AUTO: 71 % — SIGNIFICANT CHANGE UP (ref 43–77)
PLATELET # BLD AUTO: 93 K/UL — LOW (ref 150–400)
POTASSIUM SERPL-MCNC: 4.3 MMOL/L — SIGNIFICANT CHANGE UP (ref 3.5–5.3)
POTASSIUM SERPL-SCNC: 4.3 MMOL/L — SIGNIFICANT CHANGE UP (ref 3.5–5.3)
RBC # BLD: 3.18 M/UL — LOW (ref 3.8–5.2)
RBC # FLD: 22.9 % — HIGH (ref 10.3–14.5)
SODIUM SERPL-SCNC: 136 MMOL/L — SIGNIFICANT CHANGE UP (ref 135–145)
WBC # BLD: 7.29 K/UL — SIGNIFICANT CHANGE UP (ref 3.8–10.5)
WBC # FLD AUTO: 7.29 K/UL — SIGNIFICANT CHANGE UP (ref 3.8–10.5)

## 2019-05-15 RX ORDER — SODIUM BICARBONATE 1 MEQ/ML
1 SYRINGE (ML) INTRAVENOUS
Qty: 0 | Refills: 0 | DISCHARGE

## 2019-05-15 RX ORDER — MIDODRINE HYDROCHLORIDE 2.5 MG/1
10 TABLET ORAL THREE TIMES A DAY
Refills: 0 | Status: DISCONTINUED | OUTPATIENT
Start: 2019-05-15 | End: 2019-05-21

## 2019-05-15 RX ORDER — MIDODRINE HYDROCHLORIDE 2.5 MG/1
1 TABLET ORAL
Qty: 0 | Refills: 0 | DISCHARGE
Start: 2019-05-15

## 2019-05-15 RX ORDER — FUROSEMIDE 40 MG
1 TABLET ORAL
Qty: 0 | Refills: 0 | DISCHARGE

## 2019-05-15 RX ADMIN — ANASTROZOLE 1 MILLIGRAM(S): 1 TABLET ORAL at 12:35

## 2019-05-15 RX ADMIN — SEVELAMER CARBONATE 800 MILLIGRAM(S): 2400 POWDER, FOR SUSPENSION ORAL at 08:12

## 2019-05-15 RX ADMIN — BRIMONIDINE TARTRATE 1 DROP(S): 2 SOLUTION/ DROPS OPHTHALMIC at 21:56

## 2019-05-15 RX ADMIN — Medication 125 MILLIGRAM(S): at 23:37

## 2019-05-15 RX ADMIN — Medication 100 MILLIGRAM(S): at 12:35

## 2019-05-15 RX ADMIN — Medication 125 MILLIGRAM(S): at 12:36

## 2019-05-15 RX ADMIN — Medication 125 MILLIGRAM(S): at 17:22

## 2019-05-15 RX ADMIN — Medication 200 MILLIGRAM(S): at 12:35

## 2019-05-15 RX ADMIN — MIDODRINE HYDROCHLORIDE 10 MILLIGRAM(S): 2.5 TABLET ORAL at 13:55

## 2019-05-15 RX ADMIN — MIDODRINE HYDROCHLORIDE 10 MILLIGRAM(S): 2.5 TABLET ORAL at 21:56

## 2019-05-15 RX ADMIN — Medication 125 MILLIGRAM(S): at 05:26

## 2019-05-15 RX ADMIN — SIMVASTATIN 40 MILLIGRAM(S): 20 TABLET, FILM COATED ORAL at 21:56

## 2019-05-15 RX ADMIN — BRIMONIDINE TARTRATE 1 DROP(S): 2 SOLUTION/ DROPS OPHTHALMIC at 05:24

## 2019-05-15 RX ADMIN — APIXABAN 2.5 MILLIGRAM(S): 2.5 TABLET, FILM COATED ORAL at 05:24

## 2019-05-15 RX ADMIN — SEVELAMER CARBONATE 800 MILLIGRAM(S): 2400 POWDER, FOR SUSPENSION ORAL at 17:22

## 2019-05-15 RX ADMIN — BRIMONIDINE TARTRATE 1 DROP(S): 2 SOLUTION/ DROPS OPHTHALMIC at 13:55

## 2019-05-15 RX ADMIN — SEVELAMER CARBONATE 800 MILLIGRAM(S): 2400 POWDER, FOR SUSPENSION ORAL at 12:35

## 2019-05-15 RX ADMIN — APIXABAN 2.5 MILLIGRAM(S): 2.5 TABLET, FILM COATED ORAL at 17:22

## 2019-05-15 NOTE — PROGRESS NOTE ADULT - PROBLEM SELECTOR PLAN 7
ESRD on HD MWF  - Missed HD last Friday, got HD on Saturday  - AVF on [facility] [place]  - Neph Dr Jacobo

## 2019-05-15 NOTE — PROGRESS NOTE ADULT - SUBJECTIVE AND OBJECTIVE BOX
Patient denies CP, SOB Review of systems otherwise (-)    acyclovir   Oral Tab/Cap 200 milliGRAM(s) Oral daily  allopurinol 100 milliGRAM(s) Oral daily  anastrozole 1 milliGRAM(s) Oral daily  apixaban 2.5 milliGRAM(s) Oral every 12 hours  brimonidine 0.2% Ophthalmic Solution 1 Drop(s) Both EYES every 8 hours  midodrine 10 milliGRAM(s) Oral three times a day  sevelamer carbonate 800 milliGRAM(s) Oral three times a day with meals  simvastatin 40 milliGRAM(s) Oral at bedtime  vancomycin    Solution 125 milliGRAM(s) Oral every 6 hours                            9.7    7.29  )-----------( 93       ( 15 May 2019 07:33 )             29.1       Hemoglobin: 9.7 g/dL (05-15 @ 07:33)  Hemoglobin: 9.5 g/dL (05-14 @ 07:17)  Hemoglobin: 7.3 g/dL (05-13 @ 06:53)  Hemoglobin: 8.1 g/dL (05-12 @ 06:19)  Hemoglobin: 8.1 g/dL (05-11 @ 07:00)      05-15    136  |  103  |  30<H>  ----------------------------<  97  4.3   |  28  |  3.91<H>    Ca    7.1<L>      15 May 2019 07:33      Creatinine Trend: 3.91<--, 4.79<--, 3.81<--, 3.04<--, 4.32<--, 4.06<--    COAGS:           T(C): 36.4 (05-15-19 @ 11:10), Max: 36.8 (05-15-19 @ 07:04)  HR: 86 (05-15-19 @ 11:10) (77 - 90)  BP: 123/40 (05-15-19 @ 11:10) (117/47 - 145/47)  RR: 18 (05-15-19 @ 11:10) (17 - 18)  SpO2: 98% (05-15-19 @ 11:10) (97% - 99%)  Wt(kg): --    I&O's Summary    14 May 2019 07:01  -  15 May 2019 07:00  --------------------------------------------------------  IN: 230 mL / OUT: 1400 mL / NET: -1170 mL    Gen: Appears well in NAD  HEENT:  (-)icterus (-)pallor  CV: N S1 S2 1/6 ELYSSA (+)2 Pulses B/l  Resp:  Clear to ausculatation B/L, normal effort  GI: (+) BS Soft, NT, ND  Lymph:  (-)Edema, (-)obvious lymphadenopathy  Skin: Warm to touch, Normal turgor  Psych: Appropriate mood and affect      TELEMETRY: 	  off        ASSESSMENT/PLAN: 	84y Female PMHX ESRD on HD. MM on chemo, (HAS RIGHT PERMACATH AND LEFT MEDIPORT)  Cdiff , DVT comes to ED for syncopal episode.    - A/c with eliquis    - HD per renal - aggressive fluid removal , dominic Pleural effusion ,   - ECHO noted , Severe LAE, normal LV fx ,    - cont proamatine   -  GI / DVT prophylaxis  keep K>4, mag >2.0    - Abx per medicine   - No need for further inpatient cardiac work up.    Theodore Álvarez MD, [facility]  BEEPER [phone]

## 2019-05-15 NOTE — PROGRESS NOTE ADULT - PROBLEM SELECTOR PLAN 3
Likely 2/2 MM and ESRD  - Will give 1PRBC today  - CT a/p showing large ascites, anasarca, and fat stranding along the pancreas  - Elevated ferritin

## 2019-05-15 NOTE — PROGRESS NOTE ADULT - PROBLEM SELECTOR PLAN 1
S/p syncopal episode  - Denies chest pain, palpitations, SOB  - Cardiac syncope vs neurocardiogenic syncope vs medications in the setting of chemo vs dehydration in the setting of diarrhea (history of Cdiff) vs metabolic  - C/w tele  - Hypokalemic to 2.7 on admission, replaced, still low, patient missed HD yesterday  - EKG no ischemic changes  - Trop negative  - Echo with EF >55%  - Cardio Dr Álvarez  - Medically stable for dc pending placement

## 2019-05-15 NOTE — PROGRESS NOTE ADULT - ASSESSMENT
HPI: 85 y/o Female from home with PMHX ESRD on HD. MM on chemo, Cdiff , DVT comes to ED for syncopal episode. Daughter at bedside  who states that pt had two witnessed syncopal events this morning. She says that her legs gave in and she grabbed her. No seizure like activity. Pt was sitting when occurred and dropped water bottle but did not fall, hit head or any trama from episodes. Daughter stated this happened once last year. otherwise pt has been fine. Denies nausea, vomiting, diarrhea, abdominal pain, SOB, chest pain, VU, palpitations, cough, wheezing, joint pain or swelling, fever, chills.    ED course: Patient examined at bedside in NAD AAOx3  VS T(C): 36.6 HR: 79 BP: 112/45 RR: 16 SpO2: 97%  Physical exam as above     Imaging significant for:   Large-caliber double-lumen right jugular line is noted with tip in the   right atrial region.  Left-sided Ocoxad-f-Kwjs noted with tip at the caval atrial junction.    Patient will be admitted to the telemetry due to syncopal episode  Patient improved, pending D/C rehab continue HD at rehab and PT/OT.  Problem/Plan - 1:  ·  Problem: Syncope and collapse.  Plan: S/p syncopal episode  - Denies chest pain, palpitations, SOB  - Cardiac syncope vs neurocardiogenic syncope vs medications in the setting of chemo vs dehydration in the setting of diarrhea (history of Cdiff) vs metabolic  - C/w tele  - Hypokalemic to 2.7 on admission, replaced, still low, patient missed HD yesterday  - EKG no ischemic changes  - Trop negative  - Echo with EF >55%  - Cardio Dr Álvarez  - Medically stable for dc pending placement.Problem/Plan - 2:  ·  Problem: Hypokalemia.  Plan: On admission K 2.7  - Holding lasix  - Replaced but still low - may be because patient missed HD  - No indication for further tele  - F/u BMP qd.     Problem/Plan - 3:  ·  Problem: Anemia.  Plan: Likely 2/2 MM and ESRD  - Will give 1PRBC today  - CT a/p showing large ascites, anasarca, and fat stranding along the pancreas  - Elevated ferritin.    Problem/Plan - 4:  ·  Problem: Pulmonary nodule.  Plan: RLL pulm nodule on CT a/p  - Recommend repeat CT in 3 months.     Problem/Plan - 5:  ·  Problem: Clostridium difficile colitis.  Plan: Patient with history of C diff on Vancomycin 125mg q6hrs to complete for 10 days.   q6hrs to complete for 10 days.     Problem/Plan - 6:  Problem: Deep vein thrombosis (DVT). Plan: History of DVT of lower extremities  - C/w Eliquis.    Problem/Plan - 7:  ·  Problem: End stage renal disease.  Plan: ESRD on HD MWF  - Missed HD last Friday, got HD on Saturday  - AVF on [facility] [place]  - Neph Dr Jacobo.     Problem/Plan - 8:  ·  Problem: HLD (hyperlipidemia).  Plan: C/w Statin.     Problem/Plan - 9:  ·  Problem: Multiple myeloma.  Plan: History of MM on chemo.    Problem/Plan - 10:  Problem: Prophylactic measure. Plan; IMPROVE VTE score: 9  C/W Eliquis

## 2019-05-15 NOTE — DISCHARGE NOTE PROVIDER - NSDCCPCAREPLAN_GEN_ALL_CORE_FT
PRINCIPAL DISCHARGE DIAGNOSIS  Diagnosis: Syncope and collapse  Assessment and Plan of Treatment: You presented with an episode of syncope. We started you on midodrine. Please continue midodrine 10mg three times per day as prescribed. Your echocardiogram is normal. Follow up with your primary doctor within 1-2 weeks.      SECONDARY DISCHARGE DIAGNOSES  Diagnosis: Hypokalemia  Assessment and Plan of Treatment: You presented with low potassium. It was replaced. This was likely due to your diarrhea. Your potassium has now been stable.    Diagnosis: End stage renal disease  Assessment and Plan of Treatment: You got dialysis on TTS. Please resume your MWF schedule.    Diagnosis: Multiple myeloma  Assessment and Plan of Treatment: Continue taking your medications as prescribed and follow up with your primary doctor.    Diagnosis: Pulmonary nodule  Assessment and Plan of Treatment: Your CT scan shows a nonspecific lung nodule. Please follow up for a repeat scan in 3 months. PRINCIPAL DISCHARGE DIAGNOSIS  Diagnosis: Syncope and collapse  Assessment and Plan of Treatment: You presented with an episode of syncope. We started you on midodrine. Please continue midodrine 10mg three times per day as prescribed. Your echocardiogram is normal. Follow up with your primary doctor within 1-2 weeks.      SECONDARY DISCHARGE DIAGNOSES  Diagnosis: End stage renal disease  Assessment and Plan of Treatment: You got dialysis on TTS. Please resume your MWF schedule.    Diagnosis: Hypokalemia  Assessment and Plan of Treatment: You presented with low potassium. It was replaced. This was likely due to your diarrhea. Your potassium has now been stable.    Diagnosis: Multiple myeloma  Assessment and Plan of Treatment: Continue taking your medications as prescribed and follow up with your primary doctor.    Diagnosis: Pulmonary nodule  Assessment and Plan of Treatment: Your CT scan shows a nonspecific lung nodule. Please follow up for a repeat scan in 3 months.

## 2019-05-15 NOTE — PROGRESS NOTE ADULT - ASSESSMENT
HPI: 83 y/o Female from home with PMHX ESRD on HD. MM on chemo, Cdiff , DVT comes to ED for syncopal episode. Daughter at bedside  who states that pt had two witnessed syncopal events this morning. She says that her legs gave in and she grabbed her. No seizure like activity. Pt was sitting when occurred and dropped water bottle but did not fall, hit head or any trama from episodes. Daughter stated this happened once last year. otherwise pt has been fine. Denies nausea, vomiting, diarrhea, abdominal pain, SOB, chest pain, VU, palpitations, cough, wheezing, joint pain or swelling, fever, chills.    ED course: Patient examined at bedside in NAD AAOx3  VS T(C): 36.6 HR: 79 BP: 112/45 RR: 16 SpO2: 97%  Physical exam as above     Imaging significant for:   Large-caliber double-lumen right jugular line is noted with tip in the   right atrial region.  Left-sided Pggajt-d-Cmpb noted with tip at the caval atrial junction.    Patient will be admitted to the telemetry due to syncopal episode

## 2019-05-15 NOTE — PROGRESS NOTE ADULT - SUBJECTIVE AND OBJECTIVE BOX
Patient is a 84y old  Female who presents with a chief complaint of Weakness and syncope (14 May 2019 12:12)/MM, S/P chemo/anasarca/hypotensive      INTERVAL HPI/OVERNIGHT EVENTS:  T(C): 36.8 (05-15-19 @ 07:04), Max: 36.8 (05-15-19 @ 07:04)  HR: 78 (05-15-19 @ 07:04) (77 - 90)  BP: 123/41 (05-15-19 @ 07:04) (117/47 - 147/84)  RR: 18 (05-15-19 @ 07:04) (17 - 18)  SpO2: 98% (05-15-19 @ 07:04) (97% - 99%)  Wt(kg): --    LABS:                        9.7    7.29  )-----------( 93       ( 15 May 2019 07:33 )             29.1     05-15    136  |  103  |  30<H>  ----------------------------<  97  4.3   |  28  |  3.91<H>    Ca    7.1<L>      15 May 2019 07:33          CAPILLARY BLOOD GLUCOSE            RADIOLOGY & ADDITIONAL TESTS:    Consultant(s) Notes Reviewed:  [x ] YES  [ ] NO    PHYSICAL EXAM:  GENERAL: well built, well nourished  HEAD:  Atraumatic, Normocephalic  EYES: EOMI, PERRLA, conjunctiva and sclera clear  ENT: No tonsillar erythema, exudates, or enlargement; Moist mucous membranes, Good dentition, No lesions  NECK: Supple, No JVD, Normal thyroid, no enlarged nodes  NERVOUS SYSTEM:  Alert & Oriented X3, Good concentration; Motor Strength 5/5 B/L upper and lower extremities; DTRs 2+ intact and symmetric, sensory intact  CHEST/LUNG: B/L good air entry; No rales, rhonchi, or wheezing  HEART: S1S2 normal, no S3, Regular rate and rhythm; No murmurs, rubs, or gallops  ABDOMEN: Soft, Nontender, positive distended; Bowel sounds present, positive ascites  EXTREMITIES:  2+ Peripheral Pulses, No clubbing, cyanosis, positive edema  LYMPH: No lymphadenopathy noted  SKIN: No rashes or lesions    Care Discussed with Consultants/Other Providers [ x] YES  [ ] NO

## 2019-05-15 NOTE — PROGRESS NOTE ADULT - ASSESSMENT
85 YO F w ESRD on HD MWF, MM on chemo, Cdiff , DVT comes to ED for syncopal episode.    A/P:  ESRD:  HD MWF  s/p HD yesterday  HD today again for more UF and to be back on the schedule  Renal diet    Anemia:  s/p BT  No epogen sec to MM  Heme evaluation for possible epogen    Syncope:  follow up Cardiology    CKD-MBD  Hypocalcemia:  sec to low albumin, corrected calcium is normal.  Continue Renvela  Check PO4, PTH

## 2019-05-15 NOTE — DISCHARGE NOTE PROVIDER - HOSPITAL COURSE
HPI:    83 y/o Female from home with PMHX ESRD on HD. MM on chemo, Cdiff , DVT comes to ED for syncopal episode. Daughter at bedside  who states that pt had two witnessed syncopal events this morning. She says that her legs gave in and she grabbed her. No seizure like activity. Pt was sitting when occurred and dropped water bottle but did not fall, hit head or any trama from episodes. Daughter stated this happened once last year. otherwise pt has been fine. Denies nausea, vomiting, diarrhea, abdominal pain, SOB, chest pain, VU, palpitations, cough, wheezing, joint pain or swelling, fever, chills. (10 May 2019 17:20)        Hospital Course: K low of 2.7, admitted to tele, replaced K, now normalized. Received HD on Saturday, c/w TTS for the week to switch back to MWF next week. Hgb dropped, gave 1PRBC and did CT a/p, showed only anasarca and ascites. Echo with EF wnl. D/c tele. Hb stable. Continued home meds. Increased midodrine. Opted for STC to LTC. Medically stable for dc as per attending. Plan has been d/w Dr Cook.

## 2019-05-15 NOTE — PROGRESS NOTE ADULT - SUBJECTIVE AND OBJECTIVE BOX
Dr. Russo  Office [phone]  Cell [phone]  Laura GREGORY  Cell [phone]      Patient is a 84y old  Female who presents with a chief complaint of Weakness and syncope/MM (15 May 2019 10:24)      Patient seen and examined at bedside. No chest pain/sob    VITALS:  T(F): 97.5 (05-15-19 @ 11:10), Max: 98.2 (05-15-19 @ 07:04)  HR: 86 (05-15-19 @ 11:10)  BP: 123/40 (05-15-19 @ 11:10)  RR: 18 (05-15-19 @ 11:10)  SpO2: 98% (05-15-19 @ 11:10)  Wt(kg): --    05-14 @ 07:01  -  05-15 @ 07:00  --------------------------------------------------------  IN: 230 mL / OUT: 1400 mL / NET: -1170 mL          PHYSICAL EXAM:  Constitutional: NAD  Neck: No JVD  Respiratory: CTAB, no wheezes, rales or rhonchi  Cardiovascular: S1, S2, RRR  Gastrointestinal: BS+, soft, NT, distended  Extremities: 2+ peripheral edema    Hospital Medications:   MEDICATIONS  (STANDING):  acyclovir   Oral Tab/Cap 200 milliGRAM(s) Oral daily  allopurinol 100 milliGRAM(s) Oral daily  anastrozole 1 milliGRAM(s) Oral daily  apixaban 2.5 milliGRAM(s) Oral every 12 hours  brimonidine 0.2% Ophthalmic Solution 1 Drop(s) Both EYES every 8 hours  midodrine 10 milliGRAM(s) Oral three times a day  sevelamer carbonate 800 milliGRAM(s) Oral three times a day with meals  simvastatin 40 milliGRAM(s) Oral at bedtime  vancomycin    Solution 125 milliGRAM(s) Oral every 6 hours      LABS:  05-15    136  |  103  |  30<H>  ----------------------------<  97  4.3   |  28  |  3.91<H>    Ca    7.1<L>      15 May 2019 07:33      Creatinine Trend: 3.91 <--, 4.79 <--, 3.81 <--, 3.04 <--, 4.32 <--, 4.06 <--, 3.86 <--                                9.7    7.29  )-----------( 93       ( 15 May 2019 07:33 )             29.1     Urine Studies:      Iron 53, TIBC 224, %sat 24      [05-13-19 @ 10:31]  Ferritin 940      [05-13-19 @ 14:24]  HbA1c 5.7      [05-11-19 @ 11:45]  TSH 3.97      [05-11-19 @ 07:00]  Lipid: chol 111, , HDL 50, LDL 39      [05-11-19 @ 07:00]    HBsAg Nonreact      [05-14-19 @ 21:35]      RADIOLOGY & ADDITIONAL STUDIES:

## 2019-05-16 LAB
ANION GAP SERPL CALC-SCNC: 6 MMOL/L — SIGNIFICANT CHANGE UP (ref 5–17)
BASOPHILS # BLD AUTO: 0.01 K/UL — SIGNIFICANT CHANGE UP (ref 0–0.2)
BASOPHILS NFR BLD AUTO: 0.1 % — SIGNIFICANT CHANGE UP (ref 0–2)
BUN SERPL-MCNC: 24 MG/DL — HIGH (ref 7–18)
CALCIUM SERPL-MCNC: 7.1 MG/DL — LOW (ref 8.4–10.5)
CHLORIDE SERPL-SCNC: 104 MMOL/L — SIGNIFICANT CHANGE UP (ref 96–108)
CO2 SERPL-SCNC: 28 MMOL/L — SIGNIFICANT CHANGE UP (ref 22–31)
CREAT SERPL-MCNC: 3.12 MG/DL — HIGH (ref 0.5–1.3)
EOSINOPHIL # BLD AUTO: 0 K/UL — SIGNIFICANT CHANGE UP (ref 0–0.5)
EOSINOPHIL NFR BLD AUTO: 0 % — SIGNIFICANT CHANGE UP (ref 0–6)
GLUCOSE SERPL-MCNC: 96 MG/DL — SIGNIFICANT CHANGE UP (ref 70–99)
HCT VFR BLD CALC: 28.3 % — LOW (ref 34.5–45)
HGB BLD-MCNC: 9.4 G/DL — LOW (ref 11.5–15.5)
IMM GRANULOCYTES NFR BLD AUTO: 0.4 % — SIGNIFICANT CHANGE UP (ref 0–1.5)
LYMPHOCYTES # BLD AUTO: 1.39 K/UL — SIGNIFICANT CHANGE UP (ref 1–3.3)
LYMPHOCYTES # BLD AUTO: 18 % — SIGNIFICANT CHANGE UP (ref 13–44)
MCHC RBC-ENTMCNC: 30.8 PG — SIGNIFICANT CHANGE UP (ref 27–34)
MCHC RBC-ENTMCNC: 33.2 GM/DL — SIGNIFICANT CHANGE UP (ref 32–36)
MCV RBC AUTO: 92.8 FL — SIGNIFICANT CHANGE UP (ref 80–100)
MONOCYTES # BLD AUTO: 1.66 K/UL — HIGH (ref 0–0.9)
MONOCYTES NFR BLD AUTO: 21.5 % — HIGH (ref 2–14)
NEUTROPHILS # BLD AUTO: 4.64 K/UL — SIGNIFICANT CHANGE UP (ref 1.8–7.4)
NEUTROPHILS NFR BLD AUTO: 60 % — SIGNIFICANT CHANGE UP (ref 43–77)
NRBC # BLD: 0 /100 WBCS — SIGNIFICANT CHANGE UP (ref 0–0)
PLATELET # BLD AUTO: 106 K/UL — LOW (ref 150–400)
POTASSIUM SERPL-MCNC: 4.1 MMOL/L — SIGNIFICANT CHANGE UP (ref 3.5–5.3)
POTASSIUM SERPL-SCNC: 4.1 MMOL/L — SIGNIFICANT CHANGE UP (ref 3.5–5.3)
RBC # BLD: 3.05 M/UL — LOW (ref 3.8–5.2)
RBC # FLD: 22.5 % — HIGH (ref 10.3–14.5)
SODIUM SERPL-SCNC: 138 MMOL/L — SIGNIFICANT CHANGE UP (ref 135–145)
WBC # BLD: 7.73 K/UL — SIGNIFICANT CHANGE UP (ref 3.8–10.5)
WBC # FLD AUTO: 7.73 K/UL — SIGNIFICANT CHANGE UP (ref 3.8–10.5)

## 2019-05-16 RX ADMIN — BRIMONIDINE TARTRATE 1 DROP(S): 2 SOLUTION/ DROPS OPHTHALMIC at 05:18

## 2019-05-16 RX ADMIN — MIDODRINE HYDROCHLORIDE 10 MILLIGRAM(S): 2.5 TABLET ORAL at 21:28

## 2019-05-16 RX ADMIN — Medication 200 MILLIGRAM(S): at 12:00

## 2019-05-16 RX ADMIN — BRIMONIDINE TARTRATE 1 DROP(S): 2 SOLUTION/ DROPS OPHTHALMIC at 21:28

## 2019-05-16 RX ADMIN — MIDODRINE HYDROCHLORIDE 10 MILLIGRAM(S): 2.5 TABLET ORAL at 15:48

## 2019-05-16 RX ADMIN — APIXABAN 2.5 MILLIGRAM(S): 2.5 TABLET, FILM COATED ORAL at 05:18

## 2019-05-16 RX ADMIN — Medication 125 MILLIGRAM(S): at 05:18

## 2019-05-16 RX ADMIN — BRIMONIDINE TARTRATE 1 DROP(S): 2 SOLUTION/ DROPS OPHTHALMIC at 15:51

## 2019-05-16 RX ADMIN — Medication 125 MILLIGRAM(S): at 18:54

## 2019-05-16 RX ADMIN — Medication 100 MILLIGRAM(S): at 12:06

## 2019-05-16 RX ADMIN — SEVELAMER CARBONATE 800 MILLIGRAM(S): 2400 POWDER, FOR SUSPENSION ORAL at 21:29

## 2019-05-16 RX ADMIN — Medication 125 MILLIGRAM(S): at 12:00

## 2019-05-16 RX ADMIN — SEVELAMER CARBONATE 800 MILLIGRAM(S): 2400 POWDER, FOR SUSPENSION ORAL at 12:00

## 2019-05-16 RX ADMIN — SEVELAMER CARBONATE 800 MILLIGRAM(S): 2400 POWDER, FOR SUSPENSION ORAL at 18:54

## 2019-05-16 RX ADMIN — MIDODRINE HYDROCHLORIDE 10 MILLIGRAM(S): 2.5 TABLET ORAL at 05:18

## 2019-05-16 RX ADMIN — APIXABAN 2.5 MILLIGRAM(S): 2.5 TABLET, FILM COATED ORAL at 18:54

## 2019-05-16 RX ADMIN — SIMVASTATIN 40 MILLIGRAM(S): 20 TABLET, FILM COATED ORAL at 21:28

## 2019-05-16 RX ADMIN — ANASTROZOLE 1 MILLIGRAM(S): 1 TABLET ORAL at 12:00

## 2019-05-16 NOTE — DIETITIAN INITIAL EVALUATION ADULT. - MD RECOMMEND
change diet to Mechanical soft, renal replacement, DASH/TLC, add nephrocap 1 tab/d for wound healing,

## 2019-05-16 NOTE — PROGRESS NOTE ADULT - ASSESSMENT
HPI: 85 y/o Female from home with PMHX ESRD on HD. MM on chemo, Cdiff , DVT comes to ED for syncopal episode. Daughter at bedside  who states that pt had two witnessed syncopal events this morning. She says that her legs gave in and she grabbed her. No seizure like activity. Pt was sitting when occurred and dropped water bottle but did not fall, hit head or any trama from episodes. Daughter stated this happened once last year. otherwise pt has been fine. Denies nausea, vomiting, diarrhea, abdominal pain, SOB, chest pain, VU, palpitations, cough, wheezing, joint pain or swelling, fever, chills.    ED course: Patient examined at bedside in NAD AAOx3  VS T(C): 36.6 HR: 79 BP: 112/45 RR: 16 SpO2: 97%  Physical exam as above     Imaging significant for:   Large-caliber double-lumen right jugular line is noted with tip in the   right atrial region.  Left-sided Tkcdzq-i-Xkbo noted with tip at the caval atrial junction.    Patient will be admitted to the telemetry due to syncopal episode

## 2019-05-16 NOTE — PROGRESS NOTE ADULT - ASSESSMENT
HPI: 83 y/o Female from home with PMHX ESRD on HD. MM on chemo, Cdiff , DVT comes to ED for syncopal episode. Daughter at bedside  who states that pt had two witnessed syncopal events this morning. She says that her legs gave in and she grabbed her. No seizure like activity. Pt was sitting when occurred and dropped water bottle but did not fall, hit head or any trama from episodes. Daughter stated this happened once last year. otherwise pt has been fine. Denies nausea, vomiting, diarrhea, abdominal pain, SOB, chest pain, VU, palpitations, cough, wheezing, joint pain or swelling, fever, chills.    ED course: Patient examined at bedside in NAD AAOx3  VS T(C): 36.6 HR: 79 BP: 112/45 RR: 16 SpO2: 97%  Physical exam as above     Imaging significant for:   Large-caliber double-lumen right jugular line is noted with tip in the   right atrial region.  Left-sided Msllmu-z-Pjny noted with tip at the caval atrial junction.    Patient will be admitted to the telemetry due to syncopal episode  Patient improved, pending D/C rehab continue HD at rehab and PT/OT. F/U Oncology out patient.  Problem/Plan - 1:  ·  Problem: Syncope and collapse.  Plan: S/p syncopal episode  - Denies chest pain, palpitations, SOB  - Cardiac syncope vs neurocardiogenic syncope vs medications in the setting of chemo vs dehydration in the setting of diarrhea (history of Cdiff) vs metabolic  - C/w tele  - Hypokalemic to 2.7 on admission, replaced, still low, patient missed HD yesterday  - EKG no ischemic changes  - Trop negative  - Echo with EF >55%  - Cardio Dr Álvarez  - Medically stable for dc pending placement.Problem/Plan - 2:  ·  Problem: Hypokalemia.  Plan: On admission K 2.7  - Holding lasix  - Replaced but still low - may be because patient missed HD  - No indication for further tele  - F/u BMP qd.     Problem/Plan - 3:  ·  Problem: Anemia.  Plan: Likely 2/2 MM and ESRD  - Will give 1PRBC today  - CT a/p showing large ascites, anasarca, and fat stranding along the pancreas  - Elevated ferritin.    Problem/Plan - 4:  ·  Problem: Pulmonary nodule.  Plan: RLL pulm nodule on CT a/p  - Recommend repeat CT in 3 months.     Problem/Plan - 5:  ·  Problem: Clostridium difficile colitis.  Plan: Patient with history of C diff on Vancomycin 125mg q6hrs to complete for 10 days.   q6hrs to complete for 10 days.     Problem/Plan - 6:  Problem: Deep vein thrombosis (DVT). Plan: History of DVT of lower extremities  - C/w Eliquis.    Problem/Plan - 7:  ·  Problem: End stage renal disease.  Plan: ESRD on HD MWF  - Missed HD last Friday, got HD on Saturday  - [place] [facility] [place] Neph Dr Jacobo.   Problem/Plan - 8:  ·  Problem: HLD (hyperlipidemia).  Plan: C/w Statin.     Problem/Plan - 9:  ·  Problem: Multiple myeloma.  Plan: History of MM on chemo.    Problem/Plan - 10:  Problem: Prophylactic measure. Plan; IMPROVE VTE score: 9  C/W Eliquis

## 2019-05-16 NOTE — PROGRESS NOTE ADULT - PROBLEM SELECTOR PLAN 7
ESRD on HD MWF  - Missed HD last Friday, got HD on Saturday  - AVF on [facility] [place]  - Neph Dr Jacobo

## 2019-05-16 NOTE — PROGRESS NOTE ADULT - SUBJECTIVE AND OBJECTIVE BOX
Patient is a 84y old  Female who presents with a chief complaint of Weakness and syncope (15 May 2019 16:01)/hypotensive/anasarca      INTERVAL HPI/OVERNIGHT EVENTS:  T(C): 36.4 (05-16-19 @ 07:44), Max: 36.9 (05-15-19 @ 23:30)  HR: 64 (05-16-19 @ 07:44) (64 - 97)  BP: 146/44 (05-16-19 @ 07:44) (114/38 - 146/44)  RR: 18 (05-16-19 @ 07:44) (17 - 18)  SpO2: 100% (05-16-19 @ 07:44) (98% - 100%)  Wt(kg): --    LABS:                        9.4    7.73  )-----------( 106      ( 16 May 2019 07:11 )             28.3     05-16    138  |  104  |  24<H>  ----------------------------<  96  4.1   |  28  |  3.12<H>    Ca    7.1<L>      16 May 2019 07:11          CAPILLARY BLOOD GLUCOSE            RADIOLOGY & ADDITIONAL TESTS:    Consultant(s) Notes Reviewed:  [x ] YES  [ ] NO    PHYSICAL EXAM:  GENERAL: well built, well nourished  HEAD:  Atraumatic, Normocephalic  EYES: EOMI, PERRLA, conjunctiva and sclera clear  ENT: No tonsillar erythema, exudates, or enlargement; Moist mucous membranes, Good dentition, No lesions  NECK: Supple, No JVD, Normal thyroid, no enlarged nodes  NERVOUS SYSTEM:  Alert & Oriented X3, Good concentration; Motor Strength 5/5 B/L upper and lower extremities; DTRs 2+ intact and symmetric, sensory intact  CHEST/LUNG: B/L good air entry; No rales, rhonchi, or wheezing  HEART: S1S2 normal, no S3, Regular rate and rhythm; No murmurs, rubs, or gallops  ABDOMEN: Soft, Nontender, mild distended; Bowel sounds present, positive ascites sign  EXTREMITIES:  2+ Peripheral Pulses, No clubbing, cyanosis, positive edema  LYMPH: No lymphadenopathy noted  SKIN: No rashes or lesions    Care Discussed with Consultants/Other Providers [ x] YES  [ ] NO

## 2019-05-16 NOTE — PROGRESS NOTE ADULT - ASSESSMENT
85 YO F w ESRD on HD MWF, MM on chemo, Cdiff , DVT comes to ED for syncopal episode.    A/P:  ESRD:  HD MWF  HD tomorrow  Renal diet    Anemia:  s/p BT  No epogen sec to MM  Heme evaluation for possible epogen    Syncope:  follow up Cardiology    CKD-MBD  Hypocalcemia:  sec to low albumin, corrected calcium is normal.  Continue Renvela  Check PO4, PTH

## 2019-05-16 NOTE — DIETITIAN INITIAL EVALUATION ADULT. - PROBLEM SELECTOR PLAN 2
Potassium of 2.7  Will hold patient Lasix 80mg daily for now  Received Oral Potassium  Check repeat BMP

## 2019-05-16 NOTE — DIETITIAN INITIAL EVALUATION ADULT. - PERTINENT LABORATORY DATA
05-16 Na138 mmol/L Glu 96 mg/dL K+ 4.1 mmol/L Cr  3.12 mg/dL<H> BUN 24 mg/dL<H> 05-11 Phos 4.4 mg/dL 05-10 Alb 1.9 g/dL<L> 05-11 [id number] 5.7 %<H> 05-11 Chol 111 mg/dL LDL 39 mg/dL HDL 50 mg/dL Trig 112 mg/dL

## 2019-05-16 NOTE — PROGRESS NOTE ADULT - SUBJECTIVE AND OBJECTIVE BOX
PGY1 Note discussed with Supervising Resident and Primary Attending.    Patient is a 84y old  Female who presents with a chief complaint of Weakness and syncope (16 May 2019 10:24)      INTERVAL HPI/OVERNIGHT EVENTS:  No acute overnight events. Patient seen and examined at bedside. Patient has no current complaints. Patient denies nausea, vomiting, diarrhea, chest pain, SOB, headache. Medically stable for dc pending placement.    MEDICATIONS  (STANDING):  acyclovir   Oral Tab/Cap 200 milliGRAM(s) Oral daily  allopurinol 100 milliGRAM(s) Oral daily  anastrozole 1 milliGRAM(s) Oral daily  apixaban 2.5 milliGRAM(s) Oral every 12 hours  brimonidine 0.2% Ophthalmic Solution 1 Drop(s) Both EYES every 8 hours  midodrine 10 milliGRAM(s) Oral three times a day  sevelamer carbonate 800 milliGRAM(s) Oral three times a day with meals  simvastatin 40 milliGRAM(s) Oral at bedtime  vancomycin    Solution 125 milliGRAM(s) Oral every 6 hours    MEDICATIONS  (PRN):      Allergies    No Known Allergies    Intolerances        REVIEW OF SYSTEMS:  * General: denies chills, fatigue  * Eyes: denies vision changes  * Respiratory: denies cough, SOB, wheezing  * Cardio: denies chest pain, palpitations  * GI: denies abd pain, nausea, vomiting, diarrhea  * : denies dysuria  * Neuro: denies headaches, numbness, weakness  * MSK: denies joint pain  * Skin: denies itching, rashes    Vital Signs Last 24 Hrs  T(C): 36.7 (16 May 2019 11:26), Max: 36.9 (15 May 2019 23:30)  T(F): 98.1 (16 May 2019 11:26), Max: 98.4 (15 May 2019 23:30)  HR: 74 (16 May 2019 11:26) (64 - 97)  BP: 131/97 (16 May 2019 11:26) (114/38 - 146/44)  BP(mean): --  RR: 18 (16 May 2019 11:26) (17 - 18)  SpO2: 100% (16 May 2019 11:26) (98% - 100%)    PHYSICAL EXAM :  * General: no acute distress, well-nourished, well-developed  * HEENT: atraumatic, normocephalic; moist mucus membranes; supple neck; no JVD  * CN: EOMI, PERRL  * Respiratory: cta b/l; no wheezes, rales, rhonchi  * Cardio: RRR; no appreciable murmurs, rubs, gallops  * Abd: soft, nontender, nondistended, +BS  * Neuro: AAOx3; strength 5/5; sensation intact throughout  * Extremities: no clubbing, cyanosis, edema  * Skin: no rashes    LABS:                          9.4    7.73  )-----------( 106      ( 16 May 2019 07:11 )             28.3     05-16    138  |  104  |  24<H>  ----------------------------<  96  4.1   |  28  |  3.12<H>    Ca    7.1<L>      16 May 2019 07:11          CAPILLARY BLOOD GLUCOSE          RADIOLOGY & ADDITIONAL TESTS:   no new imaging    Consultant(s) Notes Reviewed: yes

## 2019-05-16 NOTE — PROGRESS NOTE ADULT - SUBJECTIVE AND OBJECTIVE BOX
Dr. Russo  Office [phone]  Cell [phone]  Laura GREGORY  Cell [phone]      Patient is a 84y old  Female who presents with a chief complaint of Weakness and syncope (16 May 2019 14:59)      Patient seen and examined at bedside. No chest pain/sob    VITALS:  T(F): 98.1 (05-16-19 @ 20:18), Max: 98.4 (05-15-19 @ 23:30)  HR: 75 (05-16-19 @ 20:18)  BP: 144/40 (05-16-19 @ 20:18)  RR: 18 (05-16-19 @ 20:18)  SpO2: 98% (05-16-19 @ 20:18)  Wt(kg): --        PHYSICAL EXAM:  Constitutional: NAD  Neck: No JVD  Respiratory: CTAB, no wheezes, rales or rhonchi  Cardiovascular: S1, S2, RRR  Gastrointestinal: BS+, soft, NT, distended  Extremities: 2+ peripheral edema    Hospital Medications:   MEDICATIONS  (STANDING):  acyclovir   Oral Tab/Cap 200 milliGRAM(s) Oral daily  allopurinol 100 milliGRAM(s) Oral daily  anastrozole 1 milliGRAM(s) Oral daily  apixaban 2.5 milliGRAM(s) Oral every 12 hours  brimonidine 0.2% Ophthalmic Solution 1 Drop(s) Both EYES every 8 hours  midodrine 10 milliGRAM(s) Oral three times a day  sevelamer carbonate 800 milliGRAM(s) Oral three times a day with meals  simvastatin 40 milliGRAM(s) Oral at bedtime  vancomycin    Solution 125 milliGRAM(s) Oral every 6 hours      LABS:  05-16    138  |  104  |  24<H>  ----------------------------<  96  4.1   |  28  |  3.12<H>    Ca    7.1<L>      16 May 2019 07:11      Creatinine Trend: 3.12 <--, 3.91 <--, 4.79 <--, 3.81 <--, 3.04 <--, 4.32 <--, 4.06 <--, 3.86 <--                                9.4    7.73  )-----------( 106      ( 16 May 2019 07:11 )             28.3     Urine Studies:      Iron 53, TIBC 224, %sat 24      [05-13-19 @ 10:31]  Ferritin 940      [05-13-19 @ 14:24]  HbA1c 5.7      [05-11-19 @ 11:45]  TSH 3.97      [05-11-19 @ 07:00]  Lipid: chol 111, , HDL 50, LDL 39      [05-11-19 @ 07:00]    HBsAg Nonreact      [05-14-19 @ 21:35]      RADIOLOGY & ADDITIONAL STUDIES:

## 2019-05-17 LAB
ANION GAP SERPL CALC-SCNC: 6 MMOL/L — SIGNIFICANT CHANGE UP (ref 5–17)
BUN SERPL-MCNC: 33 MG/DL — HIGH (ref 7–18)
CALCIUM SERPL-MCNC: 7.1 MG/DL — LOW (ref 8.4–10.5)
CHLORIDE SERPL-SCNC: 102 MMOL/L — SIGNIFICANT CHANGE UP (ref 96–108)
CO2 SERPL-SCNC: 29 MMOL/L — SIGNIFICANT CHANGE UP (ref 22–31)
CREAT SERPL-MCNC: 3.89 MG/DL — HIGH (ref 0.5–1.3)
GLUCOSE SERPL-MCNC: 83 MG/DL — SIGNIFICANT CHANGE UP (ref 70–99)
HBV SURFACE AB SER-ACNC: REACTIVE
HCT VFR BLD CALC: 30.1 % — LOW (ref 34.5–45)
HCV AB S/CO SERPL IA: 0.16 S/CO — SIGNIFICANT CHANGE UP (ref 0–0.99)
HCV AB SERPL-IMP: SIGNIFICANT CHANGE UP
HGB BLD-MCNC: 9.9 G/DL — LOW (ref 11.5–15.5)
MCHC RBC-ENTMCNC: 30.9 PG — SIGNIFICANT CHANGE UP (ref 27–34)
MCHC RBC-ENTMCNC: 32.9 GM/DL — SIGNIFICANT CHANGE UP (ref 32–36)
MCV RBC AUTO: 94.1 FL — SIGNIFICANT CHANGE UP (ref 80–100)
NRBC # BLD: 0 /100 WBCS — SIGNIFICANT CHANGE UP (ref 0–0)
PLATELET # BLD AUTO: 141 K/UL — LOW (ref 150–400)
POTASSIUM SERPL-MCNC: 4.3 MMOL/L — SIGNIFICANT CHANGE UP (ref 3.5–5.3)
POTASSIUM SERPL-SCNC: 4.3 MMOL/L — SIGNIFICANT CHANGE UP (ref 3.5–5.3)
RBC # BLD: 3.2 M/UL — LOW (ref 3.8–5.2)
RBC # FLD: 22.3 % — HIGH (ref 10.3–14.5)
SODIUM SERPL-SCNC: 137 MMOL/L — SIGNIFICANT CHANGE UP (ref 135–145)
WBC # BLD: 8.59 K/UL — SIGNIFICANT CHANGE UP (ref 3.8–10.5)
WBC # FLD AUTO: 8.59 K/UL — SIGNIFICANT CHANGE UP (ref 3.8–10.5)

## 2019-05-17 RX ADMIN — Medication 100 MILLIGRAM(S): at 13:04

## 2019-05-17 RX ADMIN — SEVELAMER CARBONATE 800 MILLIGRAM(S): 2400 POWDER, FOR SUSPENSION ORAL at 13:03

## 2019-05-17 RX ADMIN — SEVELAMER CARBONATE 800 MILLIGRAM(S): 2400 POWDER, FOR SUSPENSION ORAL at 09:23

## 2019-05-17 RX ADMIN — BRIMONIDINE TARTRATE 1 DROP(S): 2 SOLUTION/ DROPS OPHTHALMIC at 23:08

## 2019-05-17 RX ADMIN — Medication 125 MILLIGRAM(S): at 23:08

## 2019-05-17 RX ADMIN — Medication 125 MILLIGRAM(S): at 01:16

## 2019-05-17 RX ADMIN — MIDODRINE HYDROCHLORIDE 10 MILLIGRAM(S): 2.5 TABLET ORAL at 13:04

## 2019-05-17 RX ADMIN — Medication 200 MILLIGRAM(S): at 13:03

## 2019-05-17 RX ADMIN — SIMVASTATIN 40 MILLIGRAM(S): 20 TABLET, FILM COATED ORAL at 23:08

## 2019-05-17 RX ADMIN — MIDODRINE HYDROCHLORIDE 10 MILLIGRAM(S): 2.5 TABLET ORAL at 05:45

## 2019-05-17 RX ADMIN — BRIMONIDINE TARTRATE 1 DROP(S): 2 SOLUTION/ DROPS OPHTHALMIC at 05:45

## 2019-05-17 RX ADMIN — ANASTROZOLE 1 MILLIGRAM(S): 1 TABLET ORAL at 13:03

## 2019-05-17 RX ADMIN — Medication 125 MILLIGRAM(S): at 05:45

## 2019-05-17 RX ADMIN — APIXABAN 2.5 MILLIGRAM(S): 2.5 TABLET, FILM COATED ORAL at 05:45

## 2019-05-17 RX ADMIN — Medication 125 MILLIGRAM(S): at 13:08

## 2019-05-17 RX ADMIN — BRIMONIDINE TARTRATE 1 DROP(S): 2 SOLUTION/ DROPS OPHTHALMIC at 13:04

## 2019-05-17 RX ADMIN — MIDODRINE HYDROCHLORIDE 10 MILLIGRAM(S): 2.5 TABLET ORAL at 23:07

## 2019-05-17 NOTE — PROGRESS NOTE ADULT - SUBJECTIVE AND OBJECTIVE BOX
PGY1 Note discussed with Supervising Resident and Primary Attending.    Patient is a 84y old  Female who presents with a chief complaint of Weakness and syncope (17 May 2019 11:18)      INTERVAL HPI/OVERNIGHT EVENTS:  No acute overnight events. Patient seen and examined at bedside. Patient has no current complaints. Patient denies nausea, vomiting, diarrhea, chest pain, SOB, headache. Medically stable pending bed and auth.    MEDICATIONS  (STANDING):  acyclovir   Oral Tab/Cap 200 milliGRAM(s) Oral daily  allopurinol 100 milliGRAM(s) Oral daily  anastrozole 1 milliGRAM(s) Oral daily  apixaban 2.5 milliGRAM(s) Oral every 12 hours  brimonidine 0.2% Ophthalmic Solution 1 Drop(s) Both EYES every 8 hours  midodrine 10 milliGRAM(s) Oral three times a day  sevelamer carbonate 800 milliGRAM(s) Oral three times a day with meals  simvastatin 40 milliGRAM(s) Oral at bedtime  vancomycin    Solution 125 milliGRAM(s) Oral every 6 hours    MEDICATIONS  (PRN):      Allergies    No Known Allergies    Intolerances        REVIEW OF SYSTEMS:  * General: denies chills, fatigue  * Eyes: denies vision changes  * Respiratory: denies cough, SOB, wheezing  * Cardio: denies chest pain, palpitations  * GI: denies abd pain, nausea, vomiting, diarrhea  * : denies dysuria  * Neuro: denies headaches, numbness, weakness  * MSK: denies joint pain  * Skin: denies itching, rashes    Vital Signs Last 24 Hrs  T(C): 36.5 (17 May 2019 11:30), Max: 36.7 (16 May 2019 20:18)  T(F): 97.7 (17 May 2019 11:30), Max: 98.1 (16 May 2019 20:18)  HR: 78 (17 May 2019 11:30) (70 - 78)  BP: 142/39 (17 May 2019 11:30) (126/42 - 144/40)  BP(mean): --  RR: 16 (17 May 2019 11:30) (16 - 18)  SpO2: 98% (17 May 2019 11:30) (98% - 100%)    PHYSICAL EXAM:  * General: no acute distress, cachectic  * HEENT: atraumatic, normocephalic; moist mucus membranes; supple neck; no JVD  * CN: EOMI, PERRL  * Respiratory: cta b/l; no wheezes, rales, rhonchi  * Cardio: RRR; no appreciable murmurs, rubs, gallops  * Abd: soft, nontender, nondistended, +BS  * Neuro: AAOx3; strength 5/5; sensation intact throughout  * Extremities: no clubbing, cyanosis, edema  * Skin: no rashes    LABS:                          9.9    8.59  )-----------( 141      ( 17 May 2019 07:25 )             30.1     05-17    137  |  102  |  33<H>  ----------------------------<  83  4.3   |  29  |  3.89<H>    Ca    7.1<L>      17 May 2019 07:25          CAPILLARY BLOOD GLUCOSE          RADIOLOGY & ADDITIONAL TESTS:   no new imaging    Consultant(s) Notes Reviewed: yes

## 2019-05-17 NOTE — PROGRESS NOTE ADULT - ASSESSMENT
HPI: 85 y/o Female from home with PMHX ESRD on HD. MM on chemo, Cdiff , DVT comes to ED for syncopal episode. Daughter at bedside  who states that pt had two witnessed syncopal events this morning. She says that her legs gave in and she grabbed her. No seizure like activity. Pt was sitting when occurred and dropped water bottle but did not fall, hit head or any trama from episodes. Daughter stated this happened once last year. otherwise pt has been fine. Denies nausea, vomiting, diarrhea, abdominal pain, SOB, chest pain, VU, palpitations, cough, wheezing, joint pain or swelling, fever, chills.    ED course: Patient examined at bedside in NAD AAOx3  VS T(C): 36.6 HR: 79 BP: 112/45 RR: 16 SpO2: 97%  Physical exam as above     Imaging significant for:   Large-caliber double-lumen right jugular line is noted with tip in the   right atrial region.  Left-sided [id number] noted with tip at the caval atrial junction.    Patient will be admitted to the telemetry due to syncopal episode

## 2019-05-17 NOTE — PROGRESS NOTE ADULT - ASSESSMENT
85 YO F w ESRD on HD MWF, MM on chemo, Cdiff , DVT comes to ED for syncopal episode.    A/P:  ESRD:  HD MWF  HD today  Renal diet    Anemia:  s/p BT  No epogen sec to MM  Heme evaluation for possible epogen    Syncope:  follow up Cardiology    CKD-MBD  Hypocalcemia:  sec to low albumin, corrected calcium is normal.  Continue Renvela  Check PO4, PTH

## 2019-05-17 NOTE — PROGRESS NOTE ADULT - SUBJECTIVE AND OBJECTIVE BOX
Patient is a 84y old  Female who presents with a chief complaint of Weakness and syncope (16 May 2019 14:59)/anasarca/hypotensive/anemia/S/P blood transfusion      INTERVAL HPI/OVERNIGHT EVENTS:  T(C): 36.6 (05-17-19 @ 04:10), Max: 36.7 (05-16-19 @ 11:26)  HR: 70 (05-17-19 @ 04:10) (70 - 75)  BP: 137/45 (05-17-19 @ 04:10) (126/42 - 144/40)  RR: 18 (05-17-19 @ 04:10) (18 - 18)  SpO2: 100% (05-17-19 @ 04:10) (98% - 100%)  Wt(kg): --    LABS:                        9.9    8.59  )-----------( 141      ( 17 May 2019 07:25 )             30.1     05-17    137  |  102  |  33<H>  ----------------------------<  83  4.3   |  29  |  3.89<H>    Ca    7.1<L>      17 May 2019 07:25          CAPILLARY BLOOD GLUCOSE            RADIOLOGY & ADDITIONAL TESTS:    Consultant(s) Notes Reviewed:  [x ] YES  [ ] NO    PHYSICAL EXAM:  GENERAL: well built, well nourished  HEAD:  Atraumatic, Normocephalic  EYES: EOMI, PERRLA, conjunctiva and sclera clear  ENT: No tonsillar erythema, exudates, or enlargement; Moist mucous membranes, Good dentition, No lesions  NECK: Supple, No JVD, Normal thyroid, no enlarged nodes  NERVOUS SYSTEM:  Alert & Oriented X3, Good concentration; Motor Strength 5/5 B/L upper and lower extremities; DTRs 2+ intact and symmetric, sensory intact  CHEST/LUNG: B/L good air entry; No rales, rhonchi, or wheezing  HEART: S1S2 normal, no S3, Regular rate and rhythm; No murmurs, rubs, or gallops  ABDOMEN: Soft, Nontender, mild distended; Bowel sounds present, positive ascites sign  EXTREMITIES:  2+ Peripheral Pulses, No clubbing, cyanosis,  edema improved  LYMPH: No lymphadenopathy noted  SKIN: No rashes or lesions    Care Discussed with Consultants/Other Providers [ x] YES  [ ] NO

## 2019-05-17 NOTE — PROGRESS NOTE ADULT - ASSESSMENT
HPI: 83 y/o Female from home with PMHX ESRD on HD. MM on chemo, Cdiff , DVT comes to ED for syncopal episode. Daughter at bedside  who states that pt had two witnessed syncopal events this morning. She says that her legs gave in and she grabbed her. No seizure like activity. Pt was sitting when occurred and dropped water bottle but did not fall, hit head or any trama from episodes. Daughter stated this happened once last year. otherwise pt has been fine. Denies nausea, vomiting, diarrhea, abdominal pain, SOB, chest pain, VU, palpitations, cough, wheezing, joint pain or swelling, fever, chills.    ED course: Patient examined at bedside in NAD AAOx3  VS T(C): 36.6 HR: 79 BP: 112/45 RR: 16 SpO2: 97%  Physical exam as above     Imaging significant for:   Large-caliber double-lumen right jugular line is noted with tip in the   right atrial region.  Left-sided [id number] noted with tip at the caval atrial junction.    Patient will be admitted to the telemetry due to syncopal episode  Patient improved, pending D/C rehab continue HD at rehab and PT/OT. F/U Oncology out patient. Patient is medically stable to D/C rehab  Problem/Plan - 1:  ·  Problem: Syncope and collapse.  Plan: S/p syncopal episode  - Denies chest pain, palpitations, SOB  - Cardiac syncope vs neurocardiogenic syncope vs medications in the setting of chemo vs dehydration in the setting of diarrhea (history of Cdiff) vs metabolic  - C/w tele  - Hypokalemic to 2.7 on admission, replaced, still low, patient missed HD yesterday  - EKG no ischemic changes  - Trop negative  - Echo with EF >55%  - Cardio Dr Álvarez  - Medically stable for dc pending placement.Problem/Plan - 2:  ·  Problem: Hypokalemia.  Plan: On admission K 2.7  - Holding lasix  - Replaced but still low - may be because patient missed HD  - No indication for further tele  - F/u BMP qd.     Problem/Plan - 3:  ·  Problem: Anemia.  Plan: Likely 2/2 MM and ESRD  - Will give 1PRBC today  - CT a/p showing large ascites, anasarca, and fat stranding along the pancreas  - Elevated ferritin.    Problem/Plan - 4:  ·  Problem: Pulmonary nodule.  Plan: RLL pulm nodule on CT a/p  - Recommend repeat CT in 3 months.     Problem/Plan - 5:  ·  Problem: Clostridium difficile colitis.  Plan: Patient with history of C diff on Vancomycin 125mg q6hrs to complete for 10 days.   q6hrs to complete for 10 days.     Problem/Plan - 6:  Problem: Deep vein thrombosis (DVT). Plan: History of DVT of lower extremities  - C/w Eliquis.    Problem/Plan - 7:  ·  Problem: End stage renal disease.  Plan: ESRD on HD MWF  - Missed HD last Friday, got HD on Saturday  - AVF on [facility]  - Neph Dr Jacobo.   Problem/Plan - 8:  ·  Problem: HLD (hyperlipidemia).  Plan: C/w Statin.     Problem/Plan - 9:  ·  Problem: Multiple myeloma.  Plan: History of MM on chemo.

## 2019-05-17 NOTE — PROGRESS NOTE ADULT - PROBLEM SELECTOR PLAN 1
S/p syncopal episode  - Denies chest pain, palpitations, SOB  - Cardiac syncope vs neurocardiogenic syncope vs medications in the setting of chemo vs dehydration in the setting of diarrhea (history of Cdiff) vs metabolic  - C/w tele  - Hypokalemic to 2.7 on admission, replaced, still low, patient missed HD yesterday  - EKG no ischemic changes  - Trop negative  - Echo with EF >55%  - Cardio Dr Álvarez  - Medically stable for dc pending auth

## 2019-05-17 NOTE — PROGRESS NOTE ADULT - PROBLEM SELECTOR PLAN 7
ESRD on HD MWF  - Missed HD last Friday, got HD on Saturday  - AVF on [facility]  - Neph Dr Jacobo

## 2019-05-17 NOTE — PROGRESS NOTE ADULT - SUBJECTIVE AND OBJECTIVE BOX
Patient denies CP, SOB Review of systems otherwise (-)    acyclovir   Oral Tab/Cap 200 milliGRAM(s) Oral daily  allopurinol 100 milliGRAM(s) Oral daily  anastrozole 1 milliGRAM(s) Oral daily  apixaban 2.5 milliGRAM(s) Oral every 12 hours  brimonidine 0.2% Ophthalmic Solution 1 Drop(s) Both EYES every 8 hours  midodrine 10 milliGRAM(s) Oral three times a day  sevelamer carbonate 800 milliGRAM(s) Oral three times a day with meals  simvastatin 40 milliGRAM(s) Oral at bedtime  vancomycin    Solution 125 milliGRAM(s) Oral every 6 hours                            9.9    8.59  )-----------( 141      ( 17 May 2019 07:25 )             30.1       Hemoglobin: 9.9 g/dL (05-17 @ 07:25)  Hemoglobin: 9.4 g/dL (05-16 @ 07:11)  Hemoglobin: 9.7 g/dL (05-15 @ 07:33)  Hemoglobin: 9.5 g/dL (05-14 @ 07:17)  Hemoglobin: 7.3 g/dL (05-13 @ 06:53)      05-17    137  |  102  |  33<H>  ----------------------------<  83  4.3   |  29  |  3.89<H>    Ca    7.1<L>      17 May 2019 07:25      Creatinine Trend: 3.89<--, 3.12<--, 3.91<--, 4.79<--, 3.81<--, 3.04<--    COAGS:           T(C): 36.6 (05-17-19 @ 04:10), Max: 36.7 (05-16-19 @ 11:26)  HR: 70 (05-17-19 @ 04:10) (70 - 75)  BP: 137/45 (05-17-19 @ 04:10) (126/42 - 144/40)  RR: 18 (05-17-19 @ 04:10) (18 - 18)  SpO2: 100% (05-17-19 @ 04:10) (98% - 100%)  Wt(kg): --    I&O's Summary    Gen: Appears well in NAD  HEENT:  (-)icterus (-)pallor  CV: N S1 S2 1/6 ELYSSA (+)2 Pulses B/l  Resp:  Clear to ausculatation B/L, normal effort  GI: (+) BS Soft, NT, ND  Lymph:  (-)Edema, (-)obvious lymphadenopathy  Skin: Warm to touch, Normal turgor  Psych: Appropriate mood and affect      TELEMETRY: 	  off        ASSESSMENT/PLAN: 	84y Female PMHX ESRD on HD. MM on chemo, (HAS RIGHT PERMACATH AND LEFT MEDIPORT)  Cdiff , DVT comes to ED for syncopal episode.    - A/c with eliquis    - HD per renal - aggressive fluid removal , dominic Pleural effusion ,   - ECHO noted , Severe LAE, normal LV fx ,    - cont proamatine   -  GI / DVT prophylaxis  keep K>4, mag >2.0    - Abx per medicine   - No need for further inpatient cardiac work up.  - D/C planning per Med    Theodore Álvarez MD, [facility]  BEEPER [phone]

## 2019-05-17 NOTE — PROGRESS NOTE ADULT - SUBJECTIVE AND OBJECTIVE BOX
Dr. Russo  Office [phone]  Cell [phone]  Laura GREGORY  Cell [phone]      Patient is a 84y old  Female who presents with a chief complaint of Weakness and syncope (17 May 2019 14:40)      Patient seen and examined at bedside. No chest pain/sob    VITALS:  T(F): 97.7 (05-17-19 @ 11:30), Max: 98.1 (05-16-19 @ 20:18)  HR: 78 (05-17-19 @ 11:30)  BP: 142/39 (05-17-19 @ 11:30)  RR: 16 (05-17-19 @ 11:30)  SpO2: 98% (05-17-19 @ 11:30)  Wt(kg): --        PHYSICAL EXAM:  Constitutional: NAD  Neck: No JVD  Respiratory: CTAB, no wheezes, rales or rhonchi  Cardiovascular: S1, S2, RRR  Gastrointestinal: BS+, soft, NT/ND  Extremities: + peripheral edema    Hospital Medications:   MEDICATIONS  (STANDING):  acyclovir   Oral Tab/Cap 200 milliGRAM(s) Oral daily  allopurinol 100 milliGRAM(s) Oral daily  anastrozole 1 milliGRAM(s) Oral daily  apixaban 2.5 milliGRAM(s) Oral every 12 hours  brimonidine 0.2% Ophthalmic Solution 1 Drop(s) Both EYES every 8 hours  midodrine 10 milliGRAM(s) Oral three times a day  sevelamer carbonate 800 milliGRAM(s) Oral three times a day with meals  simvastatin 40 milliGRAM(s) Oral at bedtime  vancomycin    Solution 125 milliGRAM(s) Oral every 6 hours      LABS:  05-17    137  |  102  |  33<H>  ----------------------------<  83  4.3   |  29  |  3.89<H>    Ca    7.1<L>      17 May 2019 07:25      Creatinine Trend: 3.89 <--, 3.12 <--, 3.91 <--, 4.79 <--, 3.81 <--, 3.04 <--, 4.32 <--, 4.06 <--                                9.9    8.59  )-----------( 141      ( 17 May 2019 07:25 )             30.1     Urine Studies:      Iron 53, TIBC 224, %sat 24      [05-13-19 @ 10:31]  Ferritin 940      [05-13-19 @ 14:24]  HbA1c 5.7      [05-11-19 @ 11:45]  TSH 3.97      [05-11-19 @ 07:00]  Lipid: chol 111, , HDL 50, LDL 39      [05-11-19 @ 07:00]    HBsAb Reactive      [05-16-19 @ 18:28]  HBsAg Nonreact      [05-14-19 @ 21:35]  HCV 0.16, Nonreact      [05-16-19 @ 18:28]      RADIOLOGY & ADDITIONAL STUDIES:

## 2019-05-18 RX ADMIN — MIDODRINE HYDROCHLORIDE 10 MILLIGRAM(S): 2.5 TABLET ORAL at 15:20

## 2019-05-18 RX ADMIN — SEVELAMER CARBONATE 800 MILLIGRAM(S): 2400 POWDER, FOR SUSPENSION ORAL at 17:42

## 2019-05-18 RX ADMIN — ANASTROZOLE 1 MILLIGRAM(S): 1 TABLET ORAL at 12:44

## 2019-05-18 RX ADMIN — BRIMONIDINE TARTRATE 1 DROP(S): 2 SOLUTION/ DROPS OPHTHALMIC at 22:32

## 2019-05-18 RX ADMIN — APIXABAN 2.5 MILLIGRAM(S): 2.5 TABLET, FILM COATED ORAL at 06:05

## 2019-05-18 RX ADMIN — Medication 125 MILLIGRAM(S): at 22:32

## 2019-05-18 RX ADMIN — APIXABAN 2.5 MILLIGRAM(S): 2.5 TABLET, FILM COATED ORAL at 17:42

## 2019-05-18 RX ADMIN — SIMVASTATIN 40 MILLIGRAM(S): 20 TABLET, FILM COATED ORAL at 22:32

## 2019-05-18 RX ADMIN — Medication 125 MILLIGRAM(S): at 12:45

## 2019-05-18 RX ADMIN — SEVELAMER CARBONATE 800 MILLIGRAM(S): 2400 POWDER, FOR SUSPENSION ORAL at 12:44

## 2019-05-18 RX ADMIN — SEVELAMER CARBONATE 800 MILLIGRAM(S): 2400 POWDER, FOR SUSPENSION ORAL at 09:10

## 2019-05-18 RX ADMIN — BRIMONIDINE TARTRATE 1 DROP(S): 2 SOLUTION/ DROPS OPHTHALMIC at 06:06

## 2019-05-18 RX ADMIN — Medication 200 MILLIGRAM(S): at 12:44

## 2019-05-18 RX ADMIN — BRIMONIDINE TARTRATE 1 DROP(S): 2 SOLUTION/ DROPS OPHTHALMIC at 14:21

## 2019-05-18 RX ADMIN — Medication 125 MILLIGRAM(S): at 17:43

## 2019-05-18 RX ADMIN — Medication 100 MILLIGRAM(S): at 12:44

## 2019-05-18 RX ADMIN — Medication 125 MILLIGRAM(S): at 06:05

## 2019-05-18 NOTE — PROGRESS NOTE ADULT - PROBLEM SELECTOR PLAN 7
ESRD on HD MWF  - Missed HD last Friday, got HD on Saturday  - AVF on [facility] [place]  - Neph Dr Jacobo

## 2019-05-18 NOTE — PROGRESS NOTE ADULT - SUBJECTIVE AND OBJECTIVE BOX
PGY1 Note discussed with Supervising Resident and Primary Attending.    Patient is a 84y old  Female who presents with a chief complaint of Weakness and syncope (18 May 2019 04:43)      INTERVAL HPI/OVERNIGHT EVENTS :  No acute overnight events. Patient seen and examined at bedside. Patient has no current complaints. Patient denies nausea, vomiting, diarrhea, chest pain, SOB, headache.  MEDICATIONS  (STANDING):  acyclovir   Oral Tab/Cap 200 milliGRAM(s) Oral daily  allopurinol 100 milliGRAM(s) Oral daily  anastrozole 1 milliGRAM(s) Oral daily  apixaban 2.5 milliGRAM(s) Oral every 12 hours  brimonidine 0.2% Ophthalmic Solution 1 Drop(s) Both EYES every 8 hours  midodrine 10 milliGRAM(s) Oral three times a day  sevelamer carbonate 800 milliGRAM(s) Oral three times a day with meals  simvastatin 40 milliGRAM(s) Oral at bedtime  vancomycin    Solution 125 milliGRAM(s) Oral every 6 hours    MEDICATIONS  (PRN):      Allergies    No Known Allergies    Intolerances        REVIEW OF SYSTEMS :  * General: denies chills, fatigue  * Eyes: denies vision changes  * Respiratory: denies cough, SOB, wheezing  * Cardio: denies chest pain, palpitations  * GI: denies abd pain, nausea, vomiting, diarrhea  * : denies dysuria  * Neuro: denies headaches, numbness, weakness  * MSK: denies joint pain  * Skin: denies itching, rashes    Vital Signs Last 24 Hrs  T(C): 37 (18 May 2019 07:36), Max: 37 (18 May 2019 07:36)  T(F): 98.6 (18 May 2019 07:36), Max: 98.6 (18 May 2019 07:36)  HR: 63 (18 May 2019 07:36) (62 - 82)  BP: 120/58 (18 May 2019 07:36) (120/58 - 152/44)  BP(mean): --  RR: 18 (18 May 2019 07:36) (16 - 18)  SpO2: 97% (18 May 2019 07:36) (97% - 100%)    PHYSICAL EXAM :  * General: no acute distress, well-nourished, well-developed  * HEENT: atraumatic, normocephalic; moist mucus membranes; supple neck; no JVD  * CN: EOMI, PERRL  * Respiratory: cta b/l; no wheezes, rales, rhonchi  * Cardio: RRR; no appreciable murmurs, rubs, gallops  * Abd: soft, nontender, nondistended, +BS  * Neuro: AAOx3; strength 5/5; sensation intact throughout  * Extremities: no clubbing, cyanosis, edema  * Skin: no rashes    LABS:                          9.9    8.59  )-----------( 141      ( 17 May 2019 07:25 )             30.1     05-17    137  |  102  |  33<H>  ----------------------------<  83  4.3   |  29  |  3.89<H>    Ca    7.1<L>      17 May 2019 07:25          CAPILLARY BLOOD GLUCOSE          RADIOLOGY & ADDITIONAL TESTS:   no new imaging    Imaging Personally Reviewed:    Consultant(s) Notes Reviewed: PGY1 Note discussed with Supervising Resident and Primary Attending.    Patient is a 84y old  Female who presents with a chief complaint of Weakness and syncope (18 May 2019 04:43)      INTERVAL HPI/OVERNIGHT EVENTS:  No acute overnight events. Patient seen and examined at bedside. Patient has no current complaints. Patient denies nausea, vomiting, diarrhea, chest pain, SOB, headache. Medically stable for dc pending auth and bed.    MEDICATIONS  (STANDING):  acyclovir   Oral Tab/Cap 200 milliGRAM(s) Oral daily  allopurinol 100 milliGRAM(s) Oral daily  anastrozole 1 milliGRAM(s) Oral daily  apixaban 2.5 milliGRAM(s) Oral every 12 hours  brimonidine 0.2% Ophthalmic Solution 1 Drop(s) Both EYES every 8 hours  midodrine 10 milliGRAM(s) Oral three times a day  sevelamer carbonate 800 milliGRAM(s) Oral three times a day with meals  simvastatin 40 milliGRAM(s) Oral at bedtime  vancomycin    Solution 125 milliGRAM(s) Oral every 6 hours    MEDICATIONS  (PRN):      Allergies    No Known Allergies    Intolerances        REVIEW OF SYSTEMS:  * General: denies chills, fatigue  * Eyes: denies vision changes  * Respiratory: denies cough, SOB, wheezing  * Cardio: denies chest pain, palpitations  * GI: denies abd pain, nausea, vomiting, diarrhea  * : denies dysuria  * Neuro: denies headaches, numbness, weakness  * MSK: denies joint pain  * Skin: denies itching, rashes    Vital Signs Last 24 Hrs  T(C): 37 (18 May 2019 07:36), Max: 37 (18 May 2019 07:36)  T(F): 98.6 (18 May 2019 07:36), Max: 98.6 (18 May 2019 07:36)  HR: 63 (18 May 2019 07:36) (62 - 82)  BP: 120/58 (18 May 2019 07:36) (120/58 - 152/44)  BP(mean): --  RR: 18 (18 May 2019 07:36) (16 - 18)  SpO2: 97% (18 May 2019 07:36) (97% - 100%)    PHYSICAL EXAM:  * General: no acute distress, well-nourished, well-developed  * HEENT: atraumatic, normocephalic; moist mucus membranes; supple neck; no JVD  * CN: EOMI, PERRL  * Respiratory: cta b/l; no wheezes, rales, rhonchi  * Cardio: RRR; no appreciable murmurs, rubs, gallops  * Abd: soft, nontender, nondistended, +BS  * Neuro: AAOx3; strength 5/5; sensation intact throughout  * Extremities: no clubbing, cyanosis, edema  * Skin: no rashes    LABS:                          9.9    8.59  )-----------( 141      ( 17 May 2019 07:25 )             30.1     05-17    137  |  102  |  33<H>  ----------------------------<  83  4.3   |  29  |  3.89<H>    Ca    7.1<L>      17 May 2019 07:25          CAPILLARY BLOOD GLUCOSE          RADIOLOGY & ADDITIONAL TESTS:   no new imaging    Consultant(s) Notes Reviewed: yes

## 2019-05-18 NOTE — PROGRESS NOTE ADULT - ASSESSMENT
1.	ESRD, on HD. Euvolemic and asymptomatic.  2.	Anemia, multifactorial in etiology.  3.	Myeloma.  4.	Near syncope.    PLAN:   1.	HD on Monday.  2.	Midrodrine as ordered.  3.	Follow up BMP.  4.	EPO as per Hemo given Myeloma.

## 2019-05-18 NOTE — PROGRESS NOTE ADULT - ASSESSMENT
HPI: 85 y/o Female from home with PMHX ESRD on HD. MM on chemo, Cdiff , DVT comes to ED for syncopal episode. Daughter at bedside  who states that pt had two witnessed syncopal events this morning. She says that her legs gave in and she grabbed her. No seizure like activity. Pt was sitting when occurred and dropped water bottle but did not fall, hit head or any trama from episodes. Daughter stated this happened once last year. otherwise pt has been fine. Denies nausea, vomiting, diarrhea, abdominal pain, SOB, chest pain, VU, palpitations, cough, wheezing, joint pain or swelling, fever, chills.    ED course: Patient examined at bedside in NAD AAOx3  VS T(C): 36.6 HR: 79 BP: 112/45 RR: 16 SpO2: 97%  Physical exam as above     Imaging significant for:   Large-caliber double-lumen right jugular line is noted with tip in the   right atrial region.  Left-sided Gsksuc-s-Wcyf noted with tip at the caval atrial junction.    Patient will be admitted to the telemetry due to syncopal episode

## 2019-05-18 NOTE — PROGRESS NOTE ADULT - SUBJECTIVE AND OBJECTIVE BOX
Patient denies CP, SOB Review of systems otherwise (-)    acyclovir   Oral Tab/Cap 200 milliGRAM(s) Oral daily  allopurinol 100 milliGRAM(s) Oral daily  anastrozole 1 milliGRAM(s) Oral daily  apixaban 2.5 milliGRAM(s) Oral every 12 hours  brimonidine 0.2% Ophthalmic Solution 1 Drop(s) Both EYES every 8 hours  midodrine 10 milliGRAM(s) Oral three times a day  sevelamer carbonate 800 milliGRAM(s) Oral three times a day with meals  simvastatin 40 milliGRAM(s) Oral at bedtime  vancomycin    Solution 125 milliGRAM(s) Oral every 6 hours                            9.9    8.59  )-----------( 141      ( 17 May 2019 07:25 )             30.1       Hemoglobin: 9.9 g/dL (05-17 @ 07:25)  Hemoglobin: 9.4 g/dL (05-16 @ 07:11)  Hemoglobin: 9.7 g/dL (05-15 @ 07:33)  Hemoglobin: 9.5 g/dL (05-14 @ 07:17)  Hemoglobin: 7.3 g/dL (05-13 @ 06:53)      05-17    137  |  102  |  33<H>  ----------------------------<  83  4.3   |  29  |  3.89<H>    Ca    7.1<L>      17 May 2019 07:25      Creatinine Trend: 3.89<--, 3.12<--, 3.91<--, 4.79<--, 3.81<--, 3.04<--    COAGS:           T(C): 36.8 (05-17-19 @ 23:54), Max: 36.8 (05-17-19 @ 15:49)  HR: 62 (05-17-19 @ 23:54) (62 - 82)  BP: 140/32 (05-17-19 @ 23:54) (140/32 - 152/44)  RR: 18 (05-17-19 @ 23:54) (16 - 18)  SpO2: 100% (05-17-19 @ 23:54) (98% - 100%)  Wt(kg): --    I&O's Summary    Gen: Appears well in NAD  HEENT:  (-)icterus (-)pallor  CV: N S1 S2 1/6 ELYSSA (+)2 Pulses B/l  Resp:  Clear to ausculatation B/L, normal effort  GI: (+) BS Soft, NT, ND  Lymph:  (-)Edema, (-)obvious lymphadenopathy  Skin: Warm to touch, Normal turgor  Psych: Appropriate mood and affect      TELEMETRY: 	  off        ASSESSMENT/PLAN: 	84y Female PMHX ESRD on HD. MM on chemo, (HAS RIGHT PERMACATH AND LEFT MEDIPORT)  Cdiff , DVT comes to ED for syncopal episode.    - A/c with eliquis    - HD per renal - aggressive fluid removal , dominic Pleural effusion ,   - ECHO noted , Severe LAE, normal LV fx ,    - cont proamatine   -  GI / DVT prophylaxis  keep K>4, mag >2.0    - Abx per medicine   - No need for further inpatient cardiac work up.  - D/C planning per Med  D/W Dr Pro

## 2019-05-18 NOTE — PROGRESS NOTE ADULT - SUBJECTIVE AND OBJECTIVE BOX
CELI MACDONALD  84y  Patient is a 84y old  Female who presents with a chief complaint of Weakness and syncope (18 May 2019 08:03)    HPI:  This is a patient with ESRD on HD. Last HD was yesterday. She remains euvolemic without complications.    HEALTH ISSUES - PROBLEM Dx:  Pulmonary nodule: Pulmonary nodule  Anemia: Anemia  Clostridium difficile colitis: Clostridium difficile colitis  Hypokalemia: Hypokalemia  Prophylactic measure: Prophylactic measure  Multiple myeloma: Multiple myeloma  HLD (hyperlipidemia): HLD (hyperlipidemia)  End stage renal disease: End stage renal disease  Deep vein thrombosis (DVT): Deep vein thrombosis (DVT)  Syncope and collapse: Syncope and collapse        MEDICATIONS  (STANDING):  acyclovir   Oral Tab/Cap 200 milliGRAM(s) Oral daily  allopurinol 100 milliGRAM(s) Oral daily  anastrozole 1 milliGRAM(s) Oral daily  apixaban 2.5 milliGRAM(s) Oral every 12 hours  brimonidine 0.2% Ophthalmic Solution 1 Drop(s) Both EYES every 8 hours  midodrine 10 milliGRAM(s) Oral three times a day  sevelamer carbonate 800 milliGRAM(s) Oral three times a day with meals  simvastatin 40 milliGRAM(s) Oral at bedtime  vancomycin    Solution 125 milliGRAM(s) Oral every 6 hours    MEDICATIONS  (PRN):    Vital Signs Last 24 Hrs  T(C): 36.8 (18 May 2019 20:08), Max: 37 (18 May 2019 07:36)  T(F): 98.2 (18 May 2019 20:08), Max: 98.6 (18 May 2019 07:36)  HR: 89 (18 May 2019 20:08) (62 - 89)  BP: 148/44 (18 May 2019 20:08) (120/58 - 152/42)  BP(mean): --  RR: 17 (18 May 2019 20:08) (17 - 18)  SpO2: 100% (18 May 2019 20:08) (97% - 100%)  Daily     Daily Weight in k.8 (18 May 2019 04:50)    PHYSICAL EXAM:  Constitutional:  She appears comfortable and not distressed. Not diaphoretic.    Respiratory: The lungs are clear to auscultation. No dullness and expansion is normal.    Cardiovascular: S1 and S2 are normal. No mummurs, rubs or gallops are present.    Gastrointestinal: The abdomen is soft. No tenderness is present. No masses are present. Bowel sounds are normal.    Genitourinary: The bladder is not distended. No CVA tenderness is present.    Extremities: No edema is noted. No deformities are present.    Neurological: Cognition is normal. Tone, power and sensation are normal.                               9.9    8.59  )-----------( 141      ( 17 May 2019 07:25 )             30.1     05-    137  |  102  |  33<H>  ----------------------------<  83  4.3   |  29  |  3.89<H>    Ca    7.1<L>      17 May 2019 07:25

## 2019-05-18 NOTE — PROGRESS NOTE ADULT - PROBLEM SELECTOR PLAN 1
S/p syncopal episode  - Denies chest pain, palpitations, SOB  - Cardiac syncope vs neurocardiogenic syncope vs medications in the setting of chemo vs dehydration in the setting of diarrhea (history of Cdiff) vs metabolic  - C/w tele  - Hypokalemic to 2.7 on admission, replaced, still low, patient missed HD yesterday  - EKG no ischemic changes  - Trop negative  - Echo with EF >55%  - Cardio Dr Álvarez  - Medically stable for dc pending auth S/p syncopal episode  - Denies chest pain, palpitations, SOB  - Cardiac syncope vs neurocardiogenic syncope vs medications in the setting of chemo vs dehydration in the setting of diarrhea (history of Cdiff) vs metabolic  - C/w tele  - Hypokalemic to 2.7 on admission, replaced, still low, patient missed HD yesterday  - EKG no ischemic changes  - Trop negative  - Echo with EF >55%  - Cardio Dr Álvarez  - Medically stable for dc pending auth and bed availability

## 2019-05-18 NOTE — PROGRESS NOTE ADULT - PROBLEM SELECTOR PLAN 2
On admission K 2.7  - Holding lasix  - Replaced but still low - may be because patient missed HD  - No indication for further tele  - F/u BMP qd On admission K 2.7, resolved  - Holding lasix  - Likely 2/2 diarrhea and missing dialysis  - No indication for further tele

## 2019-05-18 NOTE — PROGRESS NOTE ADULT - PROBLEM SELECTOR PLAN 3
Likely 2/2 MM and ESRD  - Will give 1PRBC today  - CT a/p showing large ascites, anasarca, and fat stranding along the pancreas  - Elevated ferritin Likely 2/2 MM and ESRD  - S/p 2 PRBC  - CT a/p showing large ascites, anasarca, and fat stranding along the pancreas  - Elevated ferritin

## 2019-05-19 RX ADMIN — SEVELAMER CARBONATE 800 MILLIGRAM(S): 2400 POWDER, FOR SUSPENSION ORAL at 17:24

## 2019-05-19 RX ADMIN — BRIMONIDINE TARTRATE 1 DROP(S): 2 SOLUTION/ DROPS OPHTHALMIC at 05:46

## 2019-05-19 RX ADMIN — BRIMONIDINE TARTRATE 1 DROP(S): 2 SOLUTION/ DROPS OPHTHALMIC at 21:38

## 2019-05-19 RX ADMIN — Medication 125 MILLIGRAM(S): at 11:47

## 2019-05-19 RX ADMIN — BRIMONIDINE TARTRATE 1 DROP(S): 2 SOLUTION/ DROPS OPHTHALMIC at 14:01

## 2019-05-19 RX ADMIN — Medication 200 MILLIGRAM(S): at 11:47

## 2019-05-19 RX ADMIN — SEVELAMER CARBONATE 800 MILLIGRAM(S): 2400 POWDER, FOR SUSPENSION ORAL at 11:47

## 2019-05-19 RX ADMIN — APIXABAN 2.5 MILLIGRAM(S): 2.5 TABLET, FILM COATED ORAL at 05:46

## 2019-05-19 RX ADMIN — APIXABAN 2.5 MILLIGRAM(S): 2.5 TABLET, FILM COATED ORAL at 17:24

## 2019-05-19 RX ADMIN — ANASTROZOLE 1 MILLIGRAM(S): 1 TABLET ORAL at 11:47

## 2019-05-19 RX ADMIN — Medication 125 MILLIGRAM(S): at 05:46

## 2019-05-19 RX ADMIN — MIDODRINE HYDROCHLORIDE 10 MILLIGRAM(S): 2.5 TABLET ORAL at 21:38

## 2019-05-19 RX ADMIN — Medication 100 MILLIGRAM(S): at 11:47

## 2019-05-19 RX ADMIN — SEVELAMER CARBONATE 800 MILLIGRAM(S): 2400 POWDER, FOR SUSPENSION ORAL at 09:35

## 2019-05-19 RX ADMIN — Medication 125 MILLIGRAM(S): at 17:26

## 2019-05-19 RX ADMIN — SIMVASTATIN 40 MILLIGRAM(S): 20 TABLET, FILM COATED ORAL at 21:38

## 2019-05-19 NOTE — PROGRESS NOTE ADULT - ASSESSMENT
HPI: 85 y/o Female from home with PMHX ESRD on HD. MM on chemo, Cdiff , DVT comes to ED for syncopal episode. Daughter at bedside  who states that pt had two witnessed syncopal events this morning. She says that her legs gave in and she grabbed her. No seizure like activity. Pt was sitting when occurred and dropped water bottle but did not fall, hit head or any trama from episodes. Daughter stated this happened once last year. otherwise pt has been fine. Denies nausea, vomiting, diarrhea, abdominal pain, SOB, chest pain, VU, palpitations, cough, wheezing, joint pain or swelling, fever, chills.  Patient will be admitted to the telemetry due to syncopal episode. Patient improved, pending D/C rehab continue HD, may consider therapeutic tap  Problem/Plan - 1:  ·  Problem: Syncope and collapse.  Plan: S/p syncopal episode  - Denies chest pain, palpitations, SOB  - Cardiac syncope vs neurocardiogenic syncope vs medications in the setting of chemo vs dehydration in the setting of diarrhea (history of Cdiff) vs metabolic  - C/w tele  - Hypokalemic to 2.7 on admission, replaced, still low, patient missed HD yesterday  - EKG no ischemic changes  - Trop negative  - Echo with EF >55%  - Cardio Dr Álvarez  - Medically stable for dc pending auth and bed availability.     Problem/Plan - 2:  ·  Problem: Hypokalemia.  Plan: On admission K 2.7, resolved  - Holding lasix  - Likely 2/2 diarrhea and missing dialysis  - No indication for further tele.     Problem/Plan - 3:  ·  Problem: Anemia.  Plan: Likely 2/2 MM and ESRD  - S/p 2 PRBC  - CT a/p showing large ascites, anasarca, and fat stranding along the pancreas  - Elevated ferritin.   Problem/Plan - 4:  ·  Problem: Pulmonary nodule.  Plan: RLL pulm nodule on CT a/p  - Recommend repeat CT in 3 months.     Problem/Plan - 5:  ·  Problem: Clostridium difficile colitis.  Plan: Patient with history of C diff on Vancomycin 125mg q6hrs to complete for 10 days.     Problem/Plan - 6:  Problem: Deep vein thrombosis (DVT). Plan: History of DVT of lower extremities  - C/w Eliquis.    Problem/Plan - 7:  ·  Problem: End stage renal disease.  Plan: ESRD on HD MWF  - Missed HD last Friday, got HD on Saturday  - AVF on E  - Nephro Dr Jacobo.     Problem/Plan - 8:  ·  Problem: HLD (hyperlipidemia).  Plan: C/w Statin.     Problem/Plan - 9:  ·  Problem: Multiple myeloma.  Plan: History of MM on chemo.     Problem/Plan - 10:  Problem: Prophylactic measure. Plan; IMPROVE VTE score: 9  C/W Eliquis

## 2019-05-19 NOTE — PROGRESS NOTE ADULT - SUBJECTIVE AND OBJECTIVE BOX
Patient denies CP, SOB Review of systems otherwise (-)    acyclovir   Oral Tab/Cap 200 milliGRAM(s) Oral daily  allopurinol 100 milliGRAM(s) Oral daily  anastrozole 1 milliGRAM(s) Oral daily  apixaban 2.5 milliGRAM(s) Oral every 12 hours  brimonidine 0.2% Ophthalmic Solution 1 Drop(s) Both EYES every 8 hours  midodrine 10 milliGRAM(s) Oral three times a day  sevelamer carbonate 800 milliGRAM(s) Oral three times a day with meals  simvastatin 40 milliGRAM(s) Oral at bedtime  vancomycin    Solution 125 milliGRAM(s) Oral every 6 hours                            9.9    8.59  )-----------( 141      ( 17 May 2019 07:25 )             30.1       Hemoglobin: 9.9 g/dL (05-17 @ 07:25)  Hemoglobin: 9.4 g/dL (05-16 @ 07:11)  Hemoglobin: 9.7 g/dL (05-15 @ 07:33)  Hemoglobin: 9.5 g/dL (05-14 @ 07:17)      05-17    137  |  102  |  33<H>  ----------------------------<  83  4.3   |  29  |  3.89<H>    Ca    7.1<L>      17 May 2019 07:25      Creatinine Trend: 3.89<--, 3.12<--, 3.91<--, 4.79<--, 3.81<--, 3.04<--    COAGS:           T(C): 36.7 (05-19-19 @ 05:03), Max: 37 (05-18-19 @ 07:36)  HR: 78 (05-19-19 @ 05:03) (63 - 89)  BP: 150/43 (05-19-19 @ 05:03) (120/58 - 150/43)  RR: 18 (05-19-19 @ 05:03) (17 - 18)  SpO2: 99% (05-19-19 @ 05:03) (97% - 100%)  Wt(kg): --    I&O's Summary    Gen: Appears well in NAD  HEENT:  (-)icterus (-)pallor  CV: N S1 S2 1/6 ELYSSA (+)2 Pulses B/l  Resp:  Clear to ausculatation B/L, normal effort  GI: (+) BS Soft, NT, ND  Lymph:  (-)Edema, (-)obvious lymphadenopathy  Skin: Warm to touch, Normal turgor  Psych: Appropriate mood and affect      TELEMETRY: 	  off        ASSESSMENT/PLAN: 	84y Female PMHX ESRD on HD. MM on chemo, (HAS RIGHT PERMACATH AND LEFT MEDIPORT)  Cdiff , DVT comes to ED for syncopal episode.    - A/c with eliquis    - HD per renal  and support bp w/ proamatine   - ECHO noted , Severe LAE, normal LV fx   -  GI / DVT prophylaxis  keep K>4, mag >2.0   - Abx per medicine   - No need for further inpatient cardiac work up.  - D/C planning per Med  D/W Dr Pro

## 2019-05-19 NOTE — PROGRESS NOTE ADULT - SUBJECTIVE AND OBJECTIVE BOX
CELI MACDONALD  84y  Patient is a 84y old  Female who presents with a chief complaint of Weakness and syncope (19 May 2019 10:17)    HPI:  Seen and examined. No new complaints.  Clinically euvolemic.    HEALTH ISSUES - PROBLEM Dx:  Pulmonary nodule: Pulmonary nodule  Anemia: Anemia  Clostridium difficile colitis: Clostridium difficile colitis  Hypokalemia: Hypokalemia  Prophylactic measure: Prophylactic measure  Multiple myeloma: Multiple myeloma  HLD (hyperlipidemia): HLD (hyperlipidemia)  End stage renal disease: End stage renal disease  Deep vein thrombosis (DVT): Deep vein thrombosis (DVT)  Syncope and collapse: Syncope and collapse        MEDICATIONS  (STANDING):  acyclovir   Oral Tab/Cap 200 milliGRAM(s) Oral daily  allopurinol 100 milliGRAM(s) Oral daily  anastrozole 1 milliGRAM(s) Oral daily  apixaban 2.5 milliGRAM(s) Oral every 12 hours  brimonidine 0.2% Ophthalmic Solution 1 Drop(s) Both EYES every 8 hours  midodrine 10 milliGRAM(s) Oral three times a day  sevelamer carbonate 800 milliGRAM(s) Oral three times a day with meals  simvastatin 40 milliGRAM(s) Oral at bedtime  vancomycin    Solution 125 milliGRAM(s) Oral every 6 hours    MEDICATIONS  (PRN):    Vital Signs Last 24 Hrs  T(C): 36.8 (19 May 2019 11:32), Max: 36.9 (18 May 2019 23:12)  T(F): 98.3 (19 May 2019 11:32), Max: 98.5 (18 May 2019 23:12)  HR: 85 (19 May 2019 11:32) (73 - 89)  BP: 140/47 (19 May 2019 11:32) (120/46 - 150/43)  BP(mean): --  RR: 18 (19 May 2019 11:32) (17 - 18)  SpO2: 97% (19 May 2019 11:32) (97% - 100%)  Daily     Daily     PHYSICAL EXAM:  Constitutional: She appears comfortable and not distressed. Not diaphoretic.    Neck:  The thyroid is normal. Trachea is midline.     Respiratory: The lungs are clear to auscultation. No dullness and expansion is normal.    Cardiovascular: S1 and S2 are normal. No mummurs, rubs or gallops are present.    Gastrointestinal: The abdomen is soft. No tenderness is present. No masses are present. Bowel sounds are normal.    Genitourinary: The bladder is not distended. No CVA tenderness is present.    Extremities: No edema is noted. No deformities are present.    Neurological: Cognition is normal. Tone, power and sensation are normal.     Psychiatric: Mood is appropriate. No hallucinations or flight of ideas are noted.

## 2019-05-19 NOTE — PROGRESS NOTE ADULT - ATTENDING COMMENTS
Agree with above assessment and plan as outlined above.    - No need for further inpatient cardiac work up.  - D/C planning per med    Theodore Álvarez MD, [facility]  BEEPER [phone]
CARDIOLOGY ATTENDING    Agree with above. Awaiting echo as part of syncope workup
Patient seen and examined.  Agree with above.   No further inpatient cardiac workup needed at this time.     Amara Pro MD
Patient seen and examined.  Agree with above.   No further inpatient cardiac workup needed at this time.     Amara Pro MD
Agree with above assessment and plan as outlined above.    - D/C tele  - No need for further inpatient cardiac work up.   - f/u orthostatics    Theodore Álvarez MD, [facility]  BEEPER [phone]
patient was seen and evaluated agreed above treatment planning

## 2019-05-19 NOTE — PROGRESS NOTE ADULT - SUBJECTIVE AND OBJECTIVE BOX
Patient is a 84y old  Female who presents with a chief complaint of Weakness and syncope (19 May 2019 06:58), stable pending D/C rehab      INTERVAL HPI/OVERNIGHT EVENTS:  T(C): 36.7 (05-19-19 @ 07:47), Max: 36.9 (05-18-19 @ 23:12)  HR: 73 (05-19-19 @ 07:47) (73 - 89)  BP: 120/46 (05-19-19 @ 07:47) (120/46 - 150/43)  RR: 18 (05-19-19 @ 07:47) (17 - 18)  SpO2: 99% (05-19-19 @ 07:47) (99% - 100%)  Wt(kg): --    LABS:              CAPILLARY BLOOD GLUCOSE            RADIOLOGY & ADDITIONAL TESTS:    Consultant(s) Notes Reviewed:  [x ] YES  [ ] NO    PHYSICAL EXAM:  GENERAL: well built, well nourished  HEAD:  Atraumatic, Normocephalic  EYES: EOMI, PERRLA, conjunctiva and sclera clear  ENT: No tonsillar erythema, exudates, or enlargement; Moist mucous membranes, Good dentition, No lesions  NECK: Supple, No JVD, Normal thyroid, no enlarged nodes  NERVOUS SYSTEM:  Alert & Oriented X3, Good concentration; Motor Strength 5/5 B/L upper and lower extremities; DTRs 2+ intact and symmetric, sensory intact  CHEST/LUNG: B/L good air entry; No rales, rhonchi, or wheezing  HEART: S1S2 normal, no S3, Regular rate and rhythm; No murmurs, rubs, or gallops  ABDOMEN: Soft, Nontender, distended; Bowel sounds present, positive ascites  EXTREMITIES:  2+ Peripheral Pulses, No clubbing, cyanosis, or edema  LYMPH: No lymphadenopathy noted  SKIN: No rashes or lesions    Care Discussed with Consultants/Other Providers [ x] YES  [ ] NO, stable pending d Warm/Dry

## 2019-05-20 LAB
ANION GAP SERPL CALC-SCNC: 9 MMOL/L — SIGNIFICANT CHANGE UP (ref 5–17)
BASOPHILS # BLD AUTO: 0.02 K/UL — SIGNIFICANT CHANGE UP (ref 0–0.2)
BASOPHILS NFR BLD AUTO: 0.3 % — SIGNIFICANT CHANGE UP (ref 0–2)
BUN SERPL-MCNC: 44 MG/DL — HIGH (ref 7–18)
CALCIUM SERPL-MCNC: 7.4 MG/DL — LOW (ref 8.4–10.5)
CHLORIDE SERPL-SCNC: 102 MMOL/L — SIGNIFICANT CHANGE UP (ref 96–108)
CO2 SERPL-SCNC: 27 MMOL/L — SIGNIFICANT CHANGE UP (ref 22–31)
CREAT SERPL-MCNC: 4.51 MG/DL — HIGH (ref 0.5–1.3)
EOSINOPHIL # BLD AUTO: 0.01 K/UL — SIGNIFICANT CHANGE UP (ref 0–0.5)
EOSINOPHIL NFR BLD AUTO: 0.1 % — SIGNIFICANT CHANGE UP (ref 0–6)
GLUCOSE SERPL-MCNC: 79 MG/DL — SIGNIFICANT CHANGE UP (ref 70–99)
HCT VFR BLD CALC: 27.4 % — LOW (ref 34.5–45)
HCV AB S/CO SERPL IA: 0.14 S/CO — SIGNIFICANT CHANGE UP (ref 0–0.99)
HCV AB SERPL-IMP: SIGNIFICANT CHANGE UP
HGB BLD-MCNC: 8.8 G/DL — LOW (ref 11.5–15.5)
IMM GRANULOCYTES NFR BLD AUTO: 0.1 % — SIGNIFICANT CHANGE UP (ref 0–1.5)
LYMPHOCYTES # BLD AUTO: 1.88 K/UL — SIGNIFICANT CHANGE UP (ref 1–3.3)
LYMPHOCYTES # BLD AUTO: 25.4 % — SIGNIFICANT CHANGE UP (ref 13–44)
MCHC RBC-ENTMCNC: 30.7 PG — SIGNIFICANT CHANGE UP (ref 27–34)
MCHC RBC-ENTMCNC: 32.1 GM/DL — SIGNIFICANT CHANGE UP (ref 32–36)
MCV RBC AUTO: 95.5 FL — SIGNIFICANT CHANGE UP (ref 80–100)
MONOCYTES # BLD AUTO: 1.14 K/UL — HIGH (ref 0–0.9)
MONOCYTES NFR BLD AUTO: 15.4 % — HIGH (ref 2–14)
NEUTROPHILS # BLD AUTO: 4.35 K/UL — SIGNIFICANT CHANGE UP (ref 1.8–7.4)
NEUTROPHILS NFR BLD AUTO: 58.7 % — SIGNIFICANT CHANGE UP (ref 43–77)
NRBC # BLD: 0 /100 WBCS — SIGNIFICANT CHANGE UP (ref 0–0)
PLATELET # BLD AUTO: 174 K/UL — SIGNIFICANT CHANGE UP (ref 150–400)
POTASSIUM SERPL-MCNC: 4.8 MMOL/L — SIGNIFICANT CHANGE UP (ref 3.5–5.3)
POTASSIUM SERPL-SCNC: 4.8 MMOL/L — SIGNIFICANT CHANGE UP (ref 3.5–5.3)
RBC # BLD: 2.87 M/UL — LOW (ref 3.8–5.2)
RBC # FLD: 22.1 % — HIGH (ref 10.3–14.5)
SODIUM SERPL-SCNC: 138 MMOL/L — SIGNIFICANT CHANGE UP (ref 135–145)
WBC # BLD: 7.41 K/UL — SIGNIFICANT CHANGE UP (ref 3.8–10.5)
WBC # FLD AUTO: 7.41 K/UL — SIGNIFICANT CHANGE UP (ref 3.8–10.5)

## 2019-05-20 RX ORDER — CHLORHEXIDINE GLUCONATE 213 G/1000ML
1 SOLUTION TOPICAL
Refills: 0 | Status: DISCONTINUED | OUTPATIENT
Start: 2019-05-20 | End: 2019-05-21

## 2019-05-20 RX ADMIN — MIDODRINE HYDROCHLORIDE 10 MILLIGRAM(S): 2.5 TABLET ORAL at 06:03

## 2019-05-20 RX ADMIN — Medication 125 MILLIGRAM(S): at 00:57

## 2019-05-20 RX ADMIN — Medication 125 MILLIGRAM(S): at 06:03

## 2019-05-20 RX ADMIN — BRIMONIDINE TARTRATE 1 DROP(S): 2 SOLUTION/ DROPS OPHTHALMIC at 15:22

## 2019-05-20 RX ADMIN — CHLORHEXIDINE GLUCONATE 1 APPLICATION(S): 213 SOLUTION TOPICAL at 18:14

## 2019-05-20 RX ADMIN — APIXABAN 2.5 MILLIGRAM(S): 2.5 TABLET, FILM COATED ORAL at 06:03

## 2019-05-20 RX ADMIN — Medication 200 MILLIGRAM(S): at 12:53

## 2019-05-20 RX ADMIN — BRIMONIDINE TARTRATE 1 DROP(S): 2 SOLUTION/ DROPS OPHTHALMIC at 21:24

## 2019-05-20 RX ADMIN — ANASTROZOLE 1 MILLIGRAM(S): 1 TABLET ORAL at 12:53

## 2019-05-20 RX ADMIN — MIDODRINE HYDROCHLORIDE 10 MILLIGRAM(S): 2.5 TABLET ORAL at 21:24

## 2019-05-20 RX ADMIN — SIMVASTATIN 40 MILLIGRAM(S): 20 TABLET, FILM COATED ORAL at 21:23

## 2019-05-20 RX ADMIN — Medication 100 MILLIGRAM(S): at 12:53

## 2019-05-20 RX ADMIN — APIXABAN 2.5 MILLIGRAM(S): 2.5 TABLET, FILM COATED ORAL at 18:05

## 2019-05-20 RX ADMIN — SEVELAMER CARBONATE 800 MILLIGRAM(S): 2400 POWDER, FOR SUSPENSION ORAL at 12:53

## 2019-05-20 RX ADMIN — Medication 125 MILLIGRAM(S): at 12:57

## 2019-05-20 RX ADMIN — BRIMONIDINE TARTRATE 1 DROP(S): 2 SOLUTION/ DROPS OPHTHALMIC at 06:03

## 2019-05-20 RX ADMIN — SEVELAMER CARBONATE 800 MILLIGRAM(S): 2400 POWDER, FOR SUSPENSION ORAL at 18:05

## 2019-05-20 RX ADMIN — MIDODRINE HYDROCHLORIDE 10 MILLIGRAM(S): 2.5 TABLET ORAL at 15:22

## 2019-05-20 NOTE — PROGRESS NOTE ADULT - ASSESSMENT
HPI: 85 y/o Female from home with PMHX ESRD on HD. MM on chemo, Cdiff , DVT comes to ED for syncopal episode. Daughter at bedside  who states that pt had two witnessed syncopal events this morning. She says that her legs gave in and she grabbed her. No seizure like activity. Pt was sitting when occurred and dropped water bottle but did not fall, hit head or any trama from episodes. Daughter stated this happened once last year. otherwise pt has been fine. Denies nausea, vomiting, diarrhea, abdominal pain, SOB, chest pain, VU, palpitations, cough, wheezing, joint pain or swelling, fever, chills.    Patient admitted to the telemetry due to syncopal episode

## 2019-05-20 NOTE — PROGRESS NOTE ADULT - PROBLEM SELECTOR PLAN 7
ESRD on HD MWF  - Missed HD last Friday, got HD on Saturday  - AVF on [facility]  - Neph Dr Jacobo

## 2019-05-20 NOTE — PROGRESS NOTE ADULT - SUBJECTIVE AND OBJECTIVE BOX
Patient is a 84y old  Female who presents with a chief complaint of Weakness and syncope (19 May 2019 13:15), improved , pending D/C planning rehab vs home with home care      INTERVAL HPI/OVERNIGHT EVENTS:  T(C): 37 (05-20-19 @ 08:15), Max: 37 (05-20-19 @ 08:15)  HR: 73 (05-20-19 @ 08:15) (71 - 90)  BP: 145/- (05-20-19 @ 08:15) (125/48 - 148/51)  RR: 18 (05-20-19 @ 08:15) (16 - 18)  SpO2: 100% (05-20-19 @ 08:15) (97% - 100%)  Wt(kg): --    LABS:                        8.8    7.41  )-----------( 174      ( 20 May 2019 05:39 )             27.4     05-20    138  |  102  |  44<H>  ----------------------------<  79  4.8   |  27  |  4.51<H>    Ca    7.4<L>      20 May 2019 05:39          CAPILLARY BLOOD GLUCOSE            RADIOLOGY & ADDITIONAL TESTS:    Consultant(s) Notes Reviewed:  [x ] YES  [ ] NO    PHYSICAL EXAM:  GENERAL: well built, well nourished  HEAD:  Atraumatic, Normocephalic  EYES: EOMI, PERRLA, conjunctiva and sclera clear  ENT: No tonsillar erythema, exudates, or enlargement; Moist mucous membranes, Good dentition, No lesions  NECK: Supple, No JVD, Normal thyroid, no enlarged nodes  NERVOUS SYSTEM:  Alert & Oriented X3, Good concentration; Motor Strength 5/5 B/L upper and lower extremities; DTRs 2+ intact and symmetric, sensory intact  CHEST/LUNG: B/L good air entry; No rales, rhonchi, or wheezing  HEART: S1S2 normal, no S3, Regular rate and rhythm; No murmurs, rubs, or gallops  ABDOMEN: Soft, Nontender, less distended; Bowel sounds present, ascites sign improved  EXTREMITIES:  2+ Peripheral Pulses, No clubbing, cyanosis, or edema  LYMPH: No lymphadenopathy noted  SKIN: No rashes or lesions    Care Discussed with Consultants/Other Providers [ x] YES  [ ] NO

## 2019-05-20 NOTE — PROGRESS NOTE ADULT - ASSESSMENT
HPI: 83 y/o Female from home with PMHX ESRD on HD. MM on chemo, Cdiff , DVT comes to ED for syncopal episode. Daughter at bedside  who states that pt had two witnessed syncopal events this morning. She says that her legs gave in and she grabbed her. No seizure like activity. Pt was sitting when occurred and dropped water bottle but did not fall, hit head or any trama from episodes. Daughter stated this happened once last year. otherwise pt has been fine. Denies nausea, vomiting, diarrhea, abdominal pain, SOB, chest pain, VU, palpitations, cough, wheezing, joint pain or swelling, fever, chills.  Patient will be admitted to the telemetry due to syncopal episode. Patient improved, pending D/C planning rehab continue HD vs home with home care.  follow up, for D/C planning

## 2019-05-20 NOTE — PROGRESS NOTE ADULT - SUBJECTIVE AND OBJECTIVE BOX
PGY 1 Note discussed with supervising resident and primary attending    Patient is a 84y old  Female who presents with a chief complaint of Weakness and syncope (20 May 2019 15:23)      INTERVAL HPI/OVERNIGHT EVENTS:   Patient seen and examined at the bedside.   DC pending placement     MEDICATIONS  (STANDING):  acyclovir   Oral Tab/Cap 200 milliGRAM(s) Oral daily  allopurinol 100 milliGRAM(s) Oral daily  anastrozole 1 milliGRAM(s) Oral daily  apixaban 2.5 milliGRAM(s) Oral every 12 hours  brimonidine 0.2% Ophthalmic Solution 1 Drop(s) Both EYES every 8 hours  chlorhexidine 4% Liquid 1 Application(s) Topical two times a day  midodrine 10 milliGRAM(s) Oral three times a day  sevelamer carbonate 800 milliGRAM(s) Oral three times a day with meals  simvastatin 40 milliGRAM(s) Oral at bedtime    MEDICATIONS  (PRN):      __________________________________________________  REVIEW OF SYSTEMS:    CONSTITUTIONAL: No fever,   EYES: no acute visual disturbances  NECK: No pain or stiffness  RESPIRATORY: No cough; No shortness of breath  CARDIOVASCULAR: No chest pain, no palpitations  GASTROINTESTINAL: No pain. No nausea or vomiting; No diarrhea   NEUROLOGICAL: No headache or numbness, no tremors  MUSCULOSKELETAL: No joint pain, no muscle pain  GENITOURINARY: no dysuria, no frequency, no hesitancy  PSYCHIATRY: no depression , no anxiety  ALL OTHER  ROS negative        Vital Signs Last 24 Hrs  T(C): 36.6 (20 May 2019 15:12), Max: 37 (20 May 2019 08:15)  T(F): 97.8 (20 May 2019 15:12), Max: 98.6 (20 May 2019 08:15)  HR: 85 (20 May 2019 15:12) (71 - 90)  BP: 140/49 (20 May 2019 15:12) (97/44 - 148/51)  BP(mean): --  RR: 17 (20 May 2019 15:12) (16 - 18)  SpO2: 99% (20 May 2019 15:12) (97% - 100%)    ________________________________________________  PHYSICAL EXAM:  GENERAL: NAD  HEENT: Normocephalic;  conjunctivae and sclerae clear; moist mucous membranes;   NECK : supple  CHEST/LUNG: Clear to auscultation bilaterally with good air entry   HEART: S1 S2  regular; no murmurs, gallops or rubs  ABDOMEN: Soft, Nontender, Nondistended; Bowel sounds present  EXTREMITIES: no cyanosis; no edema; no calf tenderness  SKIN: warm and dry; no rash  NERVOUS SYSTEM:  Awake and alert; Oriented  to place, person and time ; no new deficits    _________________________________________________  LABS:                        8.8    7.41  )-----------( 174      ( 20 May 2019 05:39 )             27.4     05-20    138  |  102  |  44<H>  ----------------------------<  79  4.8   |  27  |  4.51<H>    Ca    7.4<L>      20 May 2019 05:39          CAPILLARY BLOOD GLUCOSE            RADIOLOGY & ADDITIONAL TESTS:    Imaging Personally Reviewed:  YES/    Consultant(s) Notes Reviewed:   YES/     Care Discussed with Consultants :     Plan of care was discussed with patient and /or primary care giver; all questions and concerns were addressed and care was aligned with patient's wishes.

## 2019-05-20 NOTE — PROGRESS NOTE ADULT - PROBLEM SELECTOR PLAN 1
S/p syncopal episode  - Denies chest pain, palpitations, SOB  - Cardiac syncope vs neurocardiogenic syncope vs medications in the setting of chemo vs dehydration in the setting of diarrhea (history of Cdiff) vs metabolic  - C/w tele  - Hypokalemic to 2.7 on admission, replaced, still low, patient missed HD yesterday  - EKG no ischemic changes  - Trop negative  - Echo with EF >55%  - Cardio Dr Álvarez  - Medically stable for dc pending auth and bed availability

## 2019-05-20 NOTE — PROGRESS NOTE ADULT - ASSESSMENT
83 YO F w ESRD on HD MWF, MM on chemo, Cdiff , DVT comes to ED for syncopal episode.    A/P:  ESRD:  HD MWF  HD today  Renal diet    Anemia:  s/p BT  No epogen sec to MM  Heme evaluation for possible epogen    Syncope:  follow up Cardiology    CKD-MBD  Hypocalcemia:  sec to low albumin, corrected calcium is normal.  Continue Renvela  Check PO4, PTH

## 2019-05-20 NOTE — PROGRESS NOTE ADULT - PROBLEM SELECTOR PROBLEM 9
Multiple myeloma

## 2019-05-20 NOTE — PROGRESS NOTE ADULT - PROBLEM SELECTOR PLAN 2
On admission K 2.7, resolved  - Holding lasix  - Likely 2/2 diarrhea and missing dialysis  - No indication for further tele

## 2019-05-20 NOTE — PROGRESS NOTE ADULT - SUBJECTIVE AND OBJECTIVE BOX
Dr. Russo  Office [phone]  Cell [phone]  Laura GREGORY  Cell [phone]      Patient is a 84y old  Female who presents with a chief complaint of Weakness and syncope (20 May 2019 10:55)      Patient seen and examined at bedside. No chest pain/sob    VITALS:  T(F): 98.4 (05-20-19 @ 11:18), Max: 98.6 (05-20-19 @ 08:15)  HR: 89 (05-20-19 @ 11:18)  BP: 97/44 (05-20-19 @ 11:18)  RR: 17 (05-20-19 @ 11:18)  SpO2: 100% (05-20-19 @ 11:18)  Wt(kg): --        PHYSICAL EXAM:  Constitutional: NAD  Neck: No JVD  Respiratory: CTAB, no wheezes, rales or rhonchi  Cardiovascular: S1, S2, RRR  Gastrointestinal: BS+, soft, NT, mildly distended  Extremities: 2+ peripheral edema    Hospital Medications:   MEDICATIONS  (STANDING):  acyclovir   Oral Tab/Cap 200 milliGRAM(s) Oral daily  allopurinol 100 milliGRAM(s) Oral daily  anastrozole 1 milliGRAM(s) Oral daily  apixaban 2.5 milliGRAM(s) Oral every 12 hours  brimonidine 0.2% Ophthalmic Solution 1 Drop(s) Both EYES every 8 hours  chlorhexidine 4% Liquid 1 Application(s) Topical two times a day  midodrine 10 milliGRAM(s) Oral three times a day  sevelamer carbonate 800 milliGRAM(s) Oral three times a day with meals  simvastatin 40 milliGRAM(s) Oral at bedtime      LABS:  05-20    138  |  102  |  44<H>  ----------------------------<  79  4.8   |  27  |  4.51<H>    Ca    7.4<L>      20 May 2019 05:39      Creatinine Trend: 4.51 <--, 3.89 <--, 3.12 <--, 3.91 <--, 4.79 <--                                8.8    7.41  )-----------( 174      ( 20 May 2019 05:39 )             27.4     Urine Studies:      Iron 53, TIBC 224, %sat 24      [05-13-19 @ 10:31]  Ferritin 940      [05-13-19 @ 14:24]  HbA1c 5.7      [05-11-19 @ 11:45]  TSH 3.97      [05-11-19 @ 07:00]  Lipid: chol 111, , HDL 50, LDL 39      [05-11-19 @ 07:00]    HBsAb Reactive      [05-16-19 @ 18:28]  HBsAg Nonreact      [05-14-19 @ 21:35]  HCV 0.16, Nonreact      [05-16-19 @ 18:28]      RADIOLOGY & ADDITIONAL STUDIES:

## 2019-05-20 NOTE — PROGRESS NOTE ADULT - PROBLEM SELECTOR PROBLEM 1
Syncope and collapse

## 2019-05-20 NOTE — PROGRESS NOTE ADULT - PROBLEM SELECTOR PLAN 3
Likely 2/2 MM and ESRD  - S/p 2 PRBC  - CT a/p showing large ascites, anasarca, and fat stranding along the pancreas  - Elevated ferritin

## 2019-05-21 VITALS
TEMPERATURE: 98 F | HEART RATE: 75 BPM | SYSTOLIC BLOOD PRESSURE: 166 MMHG | OXYGEN SATURATION: 98 % | DIASTOLIC BLOOD PRESSURE: 47 MMHG | RESPIRATION RATE: 18 BRPM

## 2019-05-21 LAB
MRSA PCR RESULT.: SIGNIFICANT CHANGE UP
S AUREUS DNA NOSE QL NAA+PROBE: SIGNIFICANT CHANGE UP

## 2019-05-21 PROCEDURE — 83036 HEMOGLOBIN GLYCOSYLATED A1C: CPT

## 2019-05-21 PROCEDURE — 84443 ASSAY THYROID STIM HORMONE: CPT

## 2019-05-21 PROCEDURE — 71045 X-RAY EXAM CHEST 1 VIEW: CPT

## 2019-05-21 PROCEDURE — 80074 ACUTE HEPATITIS PANEL: CPT

## 2019-05-21 PROCEDURE — 86870 RBC ANTIBODY IDENTIFICATION: CPT

## 2019-05-21 PROCEDURE — 84100 ASSAY OF PHOSPHORUS: CPT

## 2019-05-21 PROCEDURE — 86706 HEP B SURFACE ANTIBODY: CPT

## 2019-05-21 PROCEDURE — 93306 TTE W/DOPPLER COMPLETE: CPT

## 2019-05-21 PROCEDURE — 99261: CPT

## 2019-05-21 PROCEDURE — 83540 ASSAY OF IRON: CPT

## 2019-05-21 PROCEDURE — 86900 BLOOD TYPING SEROLOGIC ABO: CPT

## 2019-05-21 PROCEDURE — 83735 ASSAY OF MAGNESIUM: CPT

## 2019-05-21 PROCEDURE — 83690 ASSAY OF LIPASE: CPT

## 2019-05-21 PROCEDURE — 74176 CT ABD & PELVIS W/O CONTRAST: CPT

## 2019-05-21 PROCEDURE — 85027 COMPLETE CBC AUTOMATED: CPT

## 2019-05-21 PROCEDURE — 84484 ASSAY OF TROPONIN QUANT: CPT

## 2019-05-21 PROCEDURE — 87640 STAPH A DNA AMP PROBE: CPT

## 2019-05-21 PROCEDURE — 80061 LIPID PANEL: CPT

## 2019-05-21 PROCEDURE — 87641 MR-STAPH DNA AMP PROBE: CPT

## 2019-05-21 PROCEDURE — 84466 ASSAY OF TRANSFERRIN: CPT

## 2019-05-21 PROCEDURE — 82746 ASSAY OF FOLIC ACID SERUM: CPT

## 2019-05-21 PROCEDURE — 80048 BASIC METABOLIC PNL TOTAL CA: CPT

## 2019-05-21 PROCEDURE — 85610 PROTHROMBIN TIME: CPT

## 2019-05-21 PROCEDURE — 86803 HEPATITIS C AB TEST: CPT

## 2019-05-21 PROCEDURE — P9040: CPT

## 2019-05-21 PROCEDURE — 86922 COMPATIBILITY TEST ANTIGLOB: CPT

## 2019-05-21 PROCEDURE — 82728 ASSAY OF FERRITIN: CPT

## 2019-05-21 PROCEDURE — 86901 BLOOD TYPING SEROLOGIC RH(D): CPT

## 2019-05-21 PROCEDURE — 99285 EMERGENCY DEPT VISIT HI MDM: CPT | Mod: 25

## 2019-05-21 PROCEDURE — 83010 ASSAY OF HAPTOGLOBIN QUANT: CPT

## 2019-05-21 PROCEDURE — 82962 GLUCOSE BLOOD TEST: CPT

## 2019-05-21 PROCEDURE — 85730 THROMBOPLASTIN TIME PARTIAL: CPT

## 2019-05-21 PROCEDURE — 80053 COMPREHEN METABOLIC PANEL: CPT

## 2019-05-21 PROCEDURE — 97161 PT EVAL LOW COMPLEX 20 MIN: CPT

## 2019-05-21 PROCEDURE — 82607 VITAMIN B-12: CPT

## 2019-05-21 PROCEDURE — 86850 RBC ANTIBODY SCREEN: CPT

## 2019-05-21 PROCEDURE — 36415 COLL VENOUS BLD VENIPUNCTURE: CPT

## 2019-05-21 PROCEDURE — 36430 TRANSFUSION BLD/BLD COMPNT: CPT

## 2019-05-21 PROCEDURE — 86880 COOMBS TEST DIRECT: CPT

## 2019-05-21 PROCEDURE — 83550 IRON BINDING TEST: CPT

## 2019-05-21 PROCEDURE — 93005 ELECTROCARDIOGRAM TRACING: CPT

## 2019-05-21 RX ORDER — MIDODRINE HYDROCHLORIDE 2.5 MG/1
1 TABLET ORAL
Qty: 90 | Refills: 0
Start: 2019-05-21 | End: 2019-06-19

## 2019-05-21 RX ORDER — APIXABAN 2.5 MG/1
1 TABLET, FILM COATED ORAL
Qty: 0 | Refills: 0 | DISCHARGE

## 2019-05-21 RX ORDER — VANCOMYCIN HCL 1 G
1 VIAL (EA) INTRAVENOUS
Qty: 0 | Refills: 0 | DISCHARGE

## 2019-05-21 RX ORDER — APIXABAN 2.5 MG/1
1 TABLET, FILM COATED ORAL
Qty: 0 | Refills: 0 | DISCHARGE
Start: 2019-05-21

## 2019-05-21 RX ADMIN — SEVELAMER CARBONATE 800 MILLIGRAM(S): 2400 POWDER, FOR SUSPENSION ORAL at 12:15

## 2019-05-21 RX ADMIN — Medication 100 MILLIGRAM(S): at 12:15

## 2019-05-21 RX ADMIN — ANASTROZOLE 1 MILLIGRAM(S): 1 TABLET ORAL at 12:15

## 2019-05-21 RX ADMIN — SEVELAMER CARBONATE 800 MILLIGRAM(S): 2400 POWDER, FOR SUSPENSION ORAL at 17:44

## 2019-05-21 RX ADMIN — APIXABAN 2.5 MILLIGRAM(S): 2.5 TABLET, FILM COATED ORAL at 05:56

## 2019-05-21 RX ADMIN — BRIMONIDINE TARTRATE 1 DROP(S): 2 SOLUTION/ DROPS OPHTHALMIC at 15:36

## 2019-05-21 RX ADMIN — Medication 200 MILLIGRAM(S): at 12:15

## 2019-05-21 RX ADMIN — BRIMONIDINE TARTRATE 1 DROP(S): 2 SOLUTION/ DROPS OPHTHALMIC at 05:56

## 2019-05-21 RX ADMIN — MIDODRINE HYDROCHLORIDE 10 MILLIGRAM(S): 2.5 TABLET ORAL at 15:36

## 2019-05-21 RX ADMIN — CHLORHEXIDINE GLUCONATE 1 APPLICATION(S): 213 SOLUTION TOPICAL at 05:56

## 2019-05-21 RX ADMIN — APIXABAN 2.5 MILLIGRAM(S): 2.5 TABLET, FILM COATED ORAL at 17:44

## 2019-05-21 RX ADMIN — SEVELAMER CARBONATE 800 MILLIGRAM(S): 2400 POWDER, FOR SUSPENSION ORAL at 08:31

## 2019-05-21 RX ADMIN — MIDODRINE HYDROCHLORIDE 10 MILLIGRAM(S): 2.5 TABLET ORAL at 05:56

## 2019-05-21 NOTE — PROGRESS NOTE ADULT - SUBJECTIVE AND OBJECTIVE BOX
Got a call from the care-coordinator to clarify if patient only needs Exceltra dialyzer.  Patient does not necessarily needs Exceltra but has been on Cellulose membrane dialyzer in view of thrombocytopenia from her last hospitalization in [facility].  Any dialyzer with cellulose membrane can be used.    Lucas Russo MD

## 2019-05-21 NOTE — PROGRESS NOTE ADULT - REASON FOR ADMISSION
Weakness and syncope
Weakness and syncope/MM
Weakness and syncope/anasarca
Weakness and syncope

## 2019-05-21 NOTE — PROGRESS NOTE ADULT - NSHPATTENDINGPLANDISCUSS_GEN_ALL_CORE
Team
resident doctor//nurse
resident doctor/nurse
resident doctor/nurse/
resident doctor/nurse

## 2019-05-21 NOTE — PROGRESS NOTE ADULT - SUBJECTIVE AND OBJECTIVE BOX
Patient denies CP, SOB Review of systems otherwise (-)    acyclovir   Oral Tab/Cap 200 milliGRAM(s) Oral daily  allopurinol 100 milliGRAM(s) Oral daily  anastrozole 1 milliGRAM(s) Oral daily  apixaban 2.5 milliGRAM(s) Oral every 12 hours  brimonidine 0.2% Ophthalmic Solution 1 Drop(s) Both EYES every 8 hours  chlorhexidine 4% Liquid 1 Application(s) Topical two times a day  midodrine 10 milliGRAM(s) Oral three times a day  sevelamer carbonate 800 milliGRAM(s) Oral three times a day with meals  simvastatin 40 milliGRAM(s) Oral at bedtime                            8.8    7.41  )-----------( 174      ( 20 May 2019 05:39 )             27.4       Hemoglobin: 8.8 g/dL (05-20 @ 05:39)  Hemoglobin: 9.9 g/dL (05-17 @ 07:25)      05-20    138  |  102  |  44<H>  ----------------------------<  79  4.8   |  27  |  4.51<H>    Ca    7.4<L>      20 May 2019 05:39      Creatinine Trend: 4.51<--, 3.89<--, 3.12<--, 3.91<--, 4.79<--, 3.81<--    COAGS:           T(C): 36.3 (05-21-19 @ 11:15), Max: 37 (05-20-19 @ 20:35)  HR: 90 (05-21-19 @ 11:15) (67 - 90)  BP: 168/55 (05-21-19 @ 11:15) (140/49 - 168/55)  RR: 18 (05-21-19 @ 11:15) (17 - 18)  SpO2: 100% (05-21-19 @ 11:15) (96% - 100%)  Wt(kg): --    I&O's Summary      Gen: Appears well in NAD  HEENT:  (-)icterus (-)pallor  CV: N S1 S2 1/6 ELYSSA (+)2 Pulses B/l  Resp:  Clear to ausculatation B/L, normal effort  GI: (+) BS Soft, NT, ND  Lymph:  (-)Edema, (-)obvious lymphadenopathy  Skin: Warm to touch, Normal turgor  Psych: Appropriate mood and affect      TELEMETRY: 	  off        ASSESSMENT/PLAN: 	84y Female PMHX ESRD on HD. MM on chemo, (HAS RIGHT PERMACATH AND LEFT MEDIPORT)  Cdiff , DVT comes to ED for syncopal episode.    - A/c with eliquis    - HD per renal  and support bp w/ proamatine   - ECHO noted , Severe LAE, normal LV fx   -  GI / DVT prophylaxis  keep K>4, mag >2.0   - Abx per medicine   - No need for further inpatient cardiac work up.  - D/C planning per Med      Theodore Álvarez MD, [facility]  BEEPER [phone]

## 2019-05-21 NOTE — PROGRESS NOTE ADULT - ASSESSMENT
83 YO F w ESRD on HD MWF, MM on chemo, Cdiff , DVT comes to ED for syncopal episode.    A/P:  ESRD:  HD MWF  s/p HD yesterday  Going for rehab today with TTS schedule  She can get her next HD on Thursday if she gets discharged  Renal diet    Anemia:  s/p BT  No epogen sec to MM  Heme evaluation for possible epogen    Syncope:  follow up Cardiology    CKD-MBD  Hypocalcemia:  sec to low albumin, corrected calcium is normal.  Continue Renvela

## 2019-05-21 NOTE — CHART NOTE - NSCHARTNOTEFT_GEN_A_CORE
For Pt Shanae Brock, called Nephrologist Karan Moya and discussed pt had last dialysis yesterday and pt can have next dialysis on thursday may 23rd, 2019.

## 2019-05-21 NOTE — PROGRESS NOTE ADULT - PROVIDER SPECIALTY LIST ADULT
Cardiology
Internal Medicine
Nephrology
Cardiology
Internal Medicine

## 2019-05-21 NOTE — PROGRESS NOTE ADULT - SUBJECTIVE AND OBJECTIVE BOX
Patient is a 84y old  Female who presents with a chief complaint of Weakness and syncope (21 May 2019 09:38), stable pending D/C NH      INTERVAL HPI/OVERNIGHT EVENTS:  T(C): 36.8 (05-21-19 @ 07:04), Max: 37 (05-20-19 @ 20:35)  HR: 67 (05-21-19 @ 07:04) (67 - 89)  BP: 147/46 (05-21-19 @ 07:04) (97/44 - 161/44)  RR: 18 (05-21-19 @ 07:04) (17 - 18)  SpO2: 98% (05-21-19 @ 07:04) (96% - 100%)  Wt(kg): --    LABS:                        8.8    7.41  )-----------( 174      ( 20 May 2019 05:39 )             27.4     05-20    138  |  102  |  44<H>  ----------------------------<  79  4.8   |  27  |  4.51<H>    Ca    7.4<L>      20 May 2019 05:39          CAPILLARY BLOOD GLUCOSE            RADIOLOGY & ADDITIONAL TESTS:    Consultant(s) Notes Reviewed:  [x ] YES  [ ] NO    PHYSICAL EXAM:  GENERAL: well built, well nourished  HEAD:  Atraumatic, Normocephalic  EYES: EOMI, PERRLA, conjunctiva and sclera clear  ENT: No tonsillar erythema, exudates, or enlargement; Moist mucous membranes, Good dentition, No lesions  NECK: Supple, No JVD, Normal thyroid, no enlarged nodes  NERVOUS SYSTEM:  Alert & Oriented X3, Good concentration; Motor Strength 5/5 B/L upper and lower extremities; DTRs 2+ intact and symmetric, sensory intact  CHEST/LUNG: B/L good air entry; No rales, rhonchi, or wheezing  HEART: S1S2 normal, no S3, Regular rate and rhythm; No murmurs, rubs, or gallops  ABDOMEN: Soft, Nontender, mild distended; Bowel sounds present  EXTREMITIES:  2+ Peripheral Pulses, No clubbing, cyanosis, or edema  LYMPH: No lymphadenopathy noted  SKIN: No rashes or lesions    Care Discussed with Consultants/Other Providers [ x] YES  [ ] NO

## 2019-05-21 NOTE — DISCHARGE NOTE NURSING/CASE MANAGEMENT/SOCIAL WORK - NSDCDPATPORTLINK_GEN_ALL_CORE
You can access the [facility] Portal, offered by [facility], by registering with the following website: [email or web address]

## 2019-05-21 NOTE — PROGRESS NOTE ADULT - ASSESSMENT
HPI: 85 y/o Female from home with PMHX ESRD on HD. MM on chemo, Cdiff , DVT comes to ED for syncopal episode. Daughter at bedside  who states that pt had two witnessed syncopal events this morning. She says that her legs gave in and she grabbed her. No seizure like activity. Pt was sitting when occurred and dropped water bottle but did not fall, hit head or any trama from episodes. Daughter stated this happened once last year. otherwise pt has been fine. Denies nausea, vomiting, diarrhea, abdominal pain, SOB, chest pain, VU, palpitations, cough, wheezing, joint pain or swelling, fever, chills.  Patient will be admitted to the telemetry due to syncopal episode. Patient improved, pending D/C planning rehab continue HD vs home with home care.  follow up, for D/C planning, medicallly stable for D.C .

## 2019-05-21 NOTE — PROGRESS NOTE ADULT - SUBJECTIVE AND OBJECTIVE BOX
Dr. Russo  Office [phone]  Cell [phone]  Laura GREGORY  Cell [phone]      Patient is a 84y old  Female who presents with a chief complaint of Weakness and syncope (21 May 2019 12:53)      Patient seen and examined at bedside. No chest pain/sob    VITALS:  T(F): 97.9 (05-21-19 @ 15:49), Max: 98.6 (05-20-19 @ 20:35)  HR: 84 (05-21-19 @ 15:49)  BP: 157/55 (05-21-19 @ 15:49)  RR: 18 (05-21-19 @ 15:49)  SpO2: 99% (05-21-19 @ 15:49)  Wt(kg): --    05-21 @ 07:01  -  05-21 @ 17:55  --------------------------------------------------------  IN: 550 mL / OUT: 0 mL / NET: 550 mL          PHYSICAL EXAM:  Constitutional: NAD  Neck: No JVD  Respiratory: CTAB, no wheezes, rales or rhonchi  Cardiovascular: S1, S2, RRR  Gastrointestinal: BS+, soft, NT/ND  Extremities: 2+ peripheral edema    Hospital Medications:   MEDICATIONS  (STANDING):  acyclovir   Oral Tab/Cap 200 milliGRAM(s) Oral daily  allopurinol 100 milliGRAM(s) Oral daily  anastrozole 1 milliGRAM(s) Oral daily  apixaban 2.5 milliGRAM(s) Oral every 12 hours  brimonidine 0.2% Ophthalmic Solution 1 Drop(s) Both EYES every 8 hours  chlorhexidine 4% Liquid 1 Application(s) Topical two times a day  midodrine 10 milliGRAM(s) Oral three times a day  sevelamer carbonate 800 milliGRAM(s) Oral three times a day with meals  simvastatin 40 milliGRAM(s) Oral at bedtime      LABS:  05-20    138  |  102  |  44<H>  ----------------------------<  79  4.8   |  27  |  4.51<H>    Ca    7.4<L>      20 May 2019 05:39      Creatinine Trend: 4.51 <--, 3.89 <--, 3.12 <--, 3.91 <--                                8.8    7.41  )-----------( 174      ( 20 May 2019 05:39 )             27.4     Urine Studies:      Iron 53, TIBC 224, %sat 24      [05-13-19 @ 10:31]  Ferritin 940      [05-13-19 @ 14:24]  HbA1c 5.7      [05-11-19 @ 11:45]  TSH 3.97      [05-11-19 @ 07:00]  Lipid: chol 111, , HDL 50, LDL 39      [05-11-19 @ 07:00]    HBsAb Reactive      [05-16-19 @ 18:28]  HBsAg Nonreact      [05-14-19 @ 21:35]  HCV 0.16, Nonreact      [05-16-19 @ 18:28]      RADIOLOGY & ADDITIONAL STUDIES:

## 2020-08-24 NOTE — ED PROVIDER NOTE - NSTIMEPROVIDERCAREINITIATE_GEN_ER
[facility] Short Note                       Date: 2020    Name: Rafael Hermosillo    MRN: [id number]         : 1944      1700 Pt admit to [facility] for Zoledronic Acid. Pt ambulatory in stable condition. Assessment completed. No new concerns voiced. Peripheral IV established with positive blood return in RAC. Labs drawn and sent for processing     Ms. Simeon's vitals were reviewed prior to and after treatment. Patient Vitals for the past 12 hrs:   Temp Pulse Resp BP   20 1713 97.7 °F (36.5 °C) 81 18 135/79       Lab called and specimen hemolyized. Lab redrawn and sent to lab and is in process.       Medications given:   NS KVO  Reclast    Shift report given to [facility] 10-May-2019 10:39

## 2021-07-16 NOTE — PROGRESS NOTE ADULT - PROBLEM SELECTOR PLAN 8
You should resume your previous diet unless otherwise instructed by your doctor. Do not drink alcoholic beverages for the next 24 hours. C/w Statin

## 2024-03-15 NOTE — PATIENT PROFILE ADULT - NSASFALLNEEDSASSIST_GEN_A_NUR
Day Pending    Insurance response  Prescription Drug Insurance: [place] Medicaid  Notes: Faxed prior authorization request to [phone] for signature. Please sign and fax to [facility] [phone], and they will complete the PA.     Please update encounter when faxed to pharmacy. Thank you!      yes